# Patient Record
Sex: FEMALE | Race: ASIAN | NOT HISPANIC OR LATINO | Employment: OTHER | URBAN - METROPOLITAN AREA
[De-identification: names, ages, dates, MRNs, and addresses within clinical notes are randomized per-mention and may not be internally consistent; named-entity substitution may affect disease eponyms.]

---

## 2022-07-11 ENCOUNTER — HOSPITAL ENCOUNTER (EMERGENCY)
Facility: CLINIC | Age: 67
Discharge: HOME | End: 2022-07-11
Attending: EMERGENCY MEDICINE | Admitting: EMERGENCY MEDICINE
Payer: COMMERCIAL

## 2022-07-11 ENCOUNTER — APPOINTMENT (OUTPATIENT)
Dept: GENERAL RADIOLOGY | Facility: CLINIC | Age: 67
End: 2022-07-11
Attending: EMERGENCY MEDICINE
Payer: COMMERCIAL

## 2022-07-11 VITALS
TEMPERATURE: 97.6 F | WEIGHT: 145.94 LBS | DIASTOLIC BLOOD PRESSURE: 66 MMHG | OXYGEN SATURATION: 93 % | SYSTOLIC BLOOD PRESSURE: 114 MMHG | HEART RATE: 96 BPM | RESPIRATION RATE: 18 BRPM

## 2022-07-11 DIAGNOSIS — E11.65 TYPE 2 DIABETES MELLITUS WITH HYPERGLYCEMIA, WITHOUT LONG-TERM CURRENT USE OF INSULIN (H): ICD-10-CM

## 2022-07-11 DIAGNOSIS — T78.40XA ALLERGIC REACTION, INITIAL ENCOUNTER: ICD-10-CM

## 2022-07-11 DIAGNOSIS — R13.10 DYSPHAGIA, UNSPECIFIED TYPE: ICD-10-CM

## 2022-07-11 LAB
ALBUMIN SERPL-MCNC: 3.7 G/DL (ref 3.4–5)
ALP SERPL-CCNC: 50 U/L (ref 40–150)
ALT SERPL W P-5'-P-CCNC: 43 U/L (ref 0–50)
ANION GAP SERPL CALCULATED.3IONS-SCNC: 9 MMOL/L (ref 3–14)
AST SERPL W P-5'-P-CCNC: 57 U/L (ref 0–45)
BASOPHILS # BLD AUTO: 0 10E3/UL (ref 0–0.2)
BASOPHILS NFR BLD AUTO: 1 %
BILIRUB SERPL-MCNC: 0.5 MG/DL (ref 0.2–1.3)
BUN SERPL-MCNC: 13 MG/DL (ref 7–30)
CALCIUM SERPL-MCNC: 8.7 MG/DL (ref 8.5–10.1)
CHLORIDE BLD-SCNC: 106 MMOL/L (ref 94–109)
CO2 SERPL-SCNC: 23 MMOL/L (ref 20–32)
CREAT SERPL-MCNC: 0.39 MG/DL (ref 0.52–1.04)
DEPRECATED S PYO AG THROAT QL EIA: NEGATIVE
EOSINOPHIL # BLD AUTO: 0.1 10E3/UL (ref 0–0.7)
EOSINOPHIL NFR BLD AUTO: 2 %
ERYTHROCYTE [DISTWIDTH] IN BLOOD BY AUTOMATED COUNT: 12.8 % (ref 10–15)
GFR SERPL CREATININE-BSD FRML MDRD: >90 ML/MIN/1.73M2
GLUCOSE BLD-MCNC: 203 MG/DL (ref 70–99)
GROUP A STREP BY PCR: NOT DETECTED
HCT VFR BLD AUTO: 44.2 % (ref 35–47)
HGB BLD-MCNC: 14.1 G/DL (ref 11.7–15.7)
IMM GRANULOCYTES # BLD: 0 10E3/UL
IMM GRANULOCYTES NFR BLD: 1 %
LYMPHOCYTES # BLD AUTO: 1.2 10E3/UL (ref 0.8–5.3)
LYMPHOCYTES NFR BLD AUTO: 31 %
MCH RBC QN AUTO: 30.1 PG (ref 26.5–33)
MCHC RBC AUTO-ENTMCNC: 31.9 G/DL (ref 31.5–36.5)
MCV RBC AUTO: 94 FL (ref 78–100)
MONOCYTES # BLD AUTO: 0.3 10E3/UL (ref 0–1.3)
MONOCYTES NFR BLD AUTO: 8 %
NEUTROPHILS # BLD AUTO: 2.3 10E3/UL (ref 1.6–8.3)
NEUTROPHILS NFR BLD AUTO: 57 %
NRBC # BLD AUTO: 0 10E3/UL
NRBC BLD AUTO-RTO: 0 /100
PLATELET # BLD AUTO: 165 10E3/UL (ref 150–450)
POTASSIUM BLD-SCNC: 5 MMOL/L (ref 3.4–5.3)
PROT SERPL-MCNC: 7.3 G/DL (ref 6.8–8.8)
RBC # BLD AUTO: 4.69 10E6/UL (ref 3.8–5.2)
SARS-COV-2 RNA RESP QL NAA+PROBE: NEGATIVE
SODIUM SERPL-SCNC: 138 MMOL/L (ref 133–144)
TROPONIN I SERPL HS-MCNC: 4 NG/L
TROPONIN I SERPL HS-MCNC: <3 NG/L
WBC # BLD AUTO: 4 10E3/UL (ref 4–11)

## 2022-07-11 PROCEDURE — 99285 EMERGENCY DEPT VISIT HI MDM: CPT | Mod: 25

## 2022-07-11 PROCEDURE — 84484 ASSAY OF TROPONIN QUANT: CPT | Mod: 91 | Performed by: EMERGENCY MEDICINE

## 2022-07-11 PROCEDURE — 80053 COMPREHEN METABOLIC PANEL: CPT | Performed by: EMERGENCY MEDICINE

## 2022-07-11 PROCEDURE — 70360 X-RAY EXAM OF NECK: CPT

## 2022-07-11 PROCEDURE — 84484 ASSAY OF TROPONIN QUANT: CPT | Performed by: EMERGENCY MEDICINE

## 2022-07-11 PROCEDURE — 96372 THER/PROPH/DIAG INJ SC/IM: CPT | Performed by: EMERGENCY MEDICINE

## 2022-07-11 PROCEDURE — 85025 COMPLETE CBC W/AUTO DIFF WBC: CPT | Performed by: EMERGENCY MEDICINE

## 2022-07-11 PROCEDURE — 87651 STREP A DNA AMP PROBE: CPT | Performed by: EMERGENCY MEDICINE

## 2022-07-11 PROCEDURE — U0003 INFECTIOUS AGENT DETECTION BY NUCLEIC ACID (DNA OR RNA); SEVERE ACUTE RESPIRATORY SYNDROME CORONAVIRUS 2 (SARS-COV-2) (CORONAVIRUS DISEASE [COVID-19]), AMPLIFIED PROBE TECHNIQUE, MAKING USE OF HIGH THROUGHPUT TECHNOLOGIES AS DESCRIBED BY CMS-2020-01-R: HCPCS | Performed by: EMERGENCY MEDICINE

## 2022-07-11 PROCEDURE — 250N000013 HC RX MED GY IP 250 OP 250 PS 637: Performed by: EMERGENCY MEDICINE

## 2022-07-11 PROCEDURE — 93005 ELECTROCARDIOGRAM TRACING: CPT

## 2022-07-11 PROCEDURE — 258N000003 HC RX IP 258 OP 636: Performed by: EMERGENCY MEDICINE

## 2022-07-11 PROCEDURE — 96374 THER/PROPH/DIAG INJ IV PUSH: CPT

## 2022-07-11 PROCEDURE — 250N000011 HC RX IP 250 OP 636: Performed by: EMERGENCY MEDICINE

## 2022-07-11 PROCEDURE — 96361 HYDRATE IV INFUSION ADD-ON: CPT

## 2022-07-11 PROCEDURE — C9803 HOPD COVID-19 SPEC COLLECT: HCPCS

## 2022-07-11 PROCEDURE — 36415 COLL VENOUS BLD VENIPUNCTURE: CPT | Performed by: EMERGENCY MEDICINE

## 2022-07-11 RX ORDER — ONDANSETRON 2 MG/ML
4 INJECTION INTRAMUSCULAR; INTRAVENOUS ONCE
Status: COMPLETED | OUTPATIENT
Start: 2022-07-11 | End: 2022-07-11

## 2022-07-11 RX ORDER — FAMOTIDINE 20 MG/1
20 TABLET, FILM COATED ORAL ONCE
Status: COMPLETED | OUTPATIENT
Start: 2022-07-11 | End: 2022-07-11

## 2022-07-11 RX ORDER — DIPHENHYDRAMINE HCL 25 MG
25 CAPSULE ORAL ONCE
Status: COMPLETED | OUTPATIENT
Start: 2022-07-11 | End: 2022-07-11

## 2022-07-11 RX ORDER — DIPHENHYDRAMINE HCL 25 MG
25 CAPSULE ORAL EVERY 6 HOURS PRN
Qty: 30 CAPSULE | Refills: 0 | Status: SHIPPED | OUTPATIENT
Start: 2022-07-11 | End: 2024-02-11

## 2022-07-11 RX ORDER — FAMOTIDINE 20 MG/1
20 TABLET, FILM COATED ORAL 2 TIMES DAILY
Qty: 10 TABLET | Refills: 0 | Status: SHIPPED | OUTPATIENT
Start: 2022-07-11 | End: 2022-07-16

## 2022-07-11 RX ORDER — EPINEPHRINE 0.3 MG/.3ML
0.3 INJECTION SUBCUTANEOUS PRN
Qty: 2 EACH | Refills: 0 | Status: SHIPPED | OUTPATIENT
Start: 2022-07-11

## 2022-07-11 RX ADMIN — SODIUM CHLORIDE 500 ML: 9 INJECTION, SOLUTION INTRAVENOUS at 10:24

## 2022-07-11 RX ADMIN — FAMOTIDINE 20 MG: 20 TABLET ORAL at 10:20

## 2022-07-11 RX ADMIN — EPINEPHRINE 0.3 MG: 1 INJECTION INTRAMUSCULAR; INTRAVENOUS; SUBCUTANEOUS at 12:09

## 2022-07-11 RX ADMIN — DIPHENHYDRAMINE HYDROCHLORIDE 25 MG: 25 CAPSULE ORAL at 10:20

## 2022-07-11 RX ADMIN — ONDANSETRON 4 MG: 2 INJECTION INTRAMUSCULAR; INTRAVENOUS at 10:20

## 2022-07-11 ASSESSMENT — ENCOUNTER SYMPTOMS
COUGH: 0
NAUSEA: 1
CHOKING: 1
CHILLS: 0
WHEEZING: 0
VOMITING: 0
FEVER: 0
TROUBLE SWALLOWING: 0
ABDOMINAL PAIN: 0
SHORTNESS OF BREATH: 0

## 2022-07-11 NOTE — DISCHARGE INSTRUCTIONS
Discharge Instructions  Hyperglycemia, High Blood Sugar    Today we found your blood sugar (glucose) was high. This may mean that you have developed diabetes, or if you already know that you have diabetes, it may mean that your diabetes is not as well controlled as it should be. Sometimes blood sugar can be high temporarily and it is not diabetes. Signs of elevated blood sugar include increased thirst, frequent urination (peeing), blurred vision, fatigue, unexplained weight loss, poor wound healing, and frequent infections.    We sometimes give medicine in the Emergency Department to lower the blood sugar. We may also prescribe medicine for you to use at home, or increase the medicine that you already take. While we do not like to see your blood sugar high, it is much more dangerous to let your blood sugar get too low, so it is reasonable to take time to bring it down, or to wait and watch to see if it comes down on its own.    Generally, every Emergency Department visit should have a follow-up clinic visit with either a primary or a specialty clinic/provider. Please follow-up as instructed by your emergency provider today.     Return to the Emergency Department if you develop:  Vomiting (throwing up).  Confusion, disorientation, or being unable to wake up.  Severe weakness or illness.  Abdominal (belly) pain.    What can I do to help myself?  Check your blood sugar as instructed by your provider.  Take medications prescribed by your provider.  Follow a diabetic diet (low fat, low concentrated sweets, high fiber).  Exercise regularly.  Moderate or eliminate alcohol use.  Stop smoking.    Diabetes: Diabetes mellitus is a disease in which the body cannot regulate the amount of sugar (glucose) in the blood. Insulin allows glucose to move out of the blood into cells throughout the body where it is used for fuel. People with diabetes do not produce enough insulin (type 1 diabetes), or cannot use insulin properly (type 2  diabetes), or both. This starves the cells that need the glucose for fuel, and also harms certain organs and tissues exposed to the high glucose levels.  Over a long period of time, uncontrolled diabetes can lead to heart and blood vessel disease, blindness, kidney failure, foot ulcers and many other problems.          About 17 million Americans (6.2% of adults) have diabetes. We think that about one third of adults with diabetes do not know they have diabetes.  The incidence of diabetes is increasing rapidly. This increase is due to many factors, but the most significant are our increasing weight and decreased activity levels.     Diabetes can be a very serious and life-threatening illness if not treated.  If you were given a prescription for medicine here today, be sure to read all of the information (including the package insert) that comes with your prescription.  This will include important information about the medicine, its side effects, and any warnings that you need to know about.  The pharmacist who fills the prescription can provide more information and answer questions you may have about the medicine.  If you have questions or concerns that the pharmacist cannot address, please call or return to the Emergency Department.   Remember that you can always come back to the Emergency Department if you are not able to see your regular provider in the amount of time listed above, if you get any new symptoms, or if there is anything that worries you.

## 2022-07-11 NOTE — ED TRIAGE NOTES
"Pt has been taking Trulicity for the past 11 weeks. Pt began \"feeling like I'm being choked\" this morning. Pt also endorses hives and rash around injection site that started 2 weeks ago. Denies trouble swallowing or breathing. Pt took benadryl at 0900 this morning and allegra last night. VSS      "

## 2022-07-11 NOTE — ED PROVIDER NOTES
History   Chief Complaint:  Allergic Reaction    The history is provided by the patient and medical records.      Charlette Rodgers is a 66 year old female with history of type II diabetes, hypertension, and hypercholesterolemia who presents with allergic reaction. Patient was prescribed Trulicity about 11 weeks ago from her primary care provider at [facility] in [place]. On her 10 th dose, the injection site developed hives and became itchy. On the 11 th dose, two days ago, the site developed hives and became itchy more than on the 10 th dose. Patient states she woke up this morning around 0600 and felt that her throat was tight and that she was choking. Reports that she can still swallow but it feels different. Patient notes that she drank water this morning because it has helped to alleviate acid reflux symptoms in the past but that she did not eat anything. States that she is feeling nauseous. Adds that she too 25 mg of benadryl this morning. Denies similar symptoms in the past. Denies dysphagia, shortness of breath, wheezing, abdominal cramping, vomiting, fever, chills, cough, and congestion.    Review of Systems   Constitutional: Negative for chills and fever.   HENT: Negative for congestion and trouble swallowing.    Respiratory: Positive for choking. Negative for cough, shortness of breath and wheezing.    Gastrointestinal: Positive for nausea. Negative for abdominal pain and vomiting.   Skin: Positive for rash (hives).   All other systems reviewed and are negative.    Allergies:  Penicillins    Medications:  Aspirin 81 MG  Simvastatin  Lisinopril  Omeprazole  Gabapentin  Metformin  Farxiga  Trulicity    Past Medical History:     Type II diabetes  Hypertension  Hypercholesterolemia  Diabetic polyneuropathy    Past Surgical History:     section    Family History:    Mother- cataract    Social History:  Presents with daughterOpal   Presents via private vehicle    Physical Exam     Patient  Vitals for the past 24 hrs:   BP Temp Temp src Pulse Resp SpO2 Weight   07/11/22 1330 114/66 -- -- 96 -- 93 % --   07/11/22 1300 126/67 -- -- 101 -- 95 % --   07/11/22 1200 133/79 -- -- 97 -- 95 % --   07/11/22 1130 121/76 -- -- 88 -- 95 % --   07/11/22 1100 (!) 142/97 -- -- 95 -- 95 % --   07/11/22 1030 130/86 -- -- 89 -- 94 % --   07/11/22 0952 (!) 182/99 97.6  F (36.4  C) Temporal 98 18 96 % 66.2 kg (145 lb 15.1 oz)     Physical Exam  General: Elderly female sitting upright  Eyes: PERRL, Conjunctive within normal limits.  No scleral icterus.  ENT: Moist mucous membranes, oropharynx clear.  Uvula fully visible and normal in appearance.  Mild posterior pharyngeal erythema, but no edema or exudate or lesions.  No pooling of secretions.  Tongue is normal in size and appearance.  No facial edema.  Speech is normal.  Neck: Trachea midline.  No palpable mass or crepitus.  CV: Normal S1S2, no murmur, rub or gallop. Regular rate and rhythm  Resp: Clear to auscultation bilaterally, no wheezes, rales or rhonchi. Normal respiratory effort.  GI: Abdomen is soft, nontender and nondistended. No palpable masses. No rebound or guarding.  MSK: No edema. Nontender. Normal active range of motion.  Skin: Warm and dry.  Circular area of erythema approximately 4 cm in diameter over the right lateral mid abdomen.  Nontender.  No palpable fluctuance or mass.  Dark red/purple area of skin color change over the dorsum of the right foot, nontender, no palpable mass or fluctuance.  No increased warmth to touch.  Neuro: Alert and oriented. Responds appropriately to all questions and commands. No focal findings appreciated. Normal muscle tone.  Psych: Normal mood and affect. Pleasant.    Emergency Department Course   ECG  ECG taken at 1017, ECG read at 1018  Normal sinus rhythm   Rate 92 bpm. GA interval 126 ms. QRS duration 86 ms. QT/QTc 376/464 ms. P-R-T axes 35 -3 6.     Imaging:  Neck soft tissue XR   Final Result        Report per  radiology    Laboratory:  Labs Ordered and Resulted from Time of ED Arrival to Time of ED Departure   COMPREHENSIVE METABOLIC PANEL - Abnormal       Result Value    Sodium 138      Potassium 5.0      Chloride 106      Carbon Dioxide (CO2) 23      Anion Gap 9      Urea Nitrogen 13      Creatinine 0.39 (*)     Calcium 8.7      Glucose 203 (*)     Alkaline Phosphatase 50      AST 57 (*)     ALT 43      Protein Total 7.3      Albumin 3.7      Bilirubin Total 0.5      GFR Estimate >90     TROPONIN I - Normal    Troponin I High Sensitivity <3     COVID-19 VIRUS (CORONAVIRUS) BY PCR - Normal    SARS CoV2 PCR Negative     TROPONIN I - Normal    Troponin I High Sensitivity 4     STREPTOCOCCUS A RAPID SCREEN W REFELX TO PCR - Normal    Group A Strep antigen Negative     CBC WITH PLATELETS AND DIFFERENTIAL    WBC Count 4.0      RBC Count 4.69      Hemoglobin 14.1      Hematocrit 44.2      MCV 94      MCH 30.1      MCHC 31.9      RDW 12.8      Platelet Count 165      % Neutrophils 57      % Lymphocytes 31      % Monocytes 8      % Eosinophils 2      % Basophils 1      % Immature Granulocytes 1      NRBCs per 100 WBC 0      Absolute Neutrophils 2.3      Absolute Lymphocytes 1.2      Absolute Monocytes 0.3      Absolute Eosinophils 0.1      Absolute Basophils 0.0      Absolute Immature Granulocytes 0.0      Absolute NRBCs 0.0     GROUP A STREPTOCOCCUS PCR THROAT SWAB     Emergency Department Course:     Reviewed:  I reviewed nursing notes, vitals, past medical history and Care Everywhere    Assessments:  1002 I obtained history and examined the patient as noted above.   1152 I rechecked and updated the patient. Patient reports that she is still experiencing a choking sensation and her throat hurts when she swallows.   1336 I rechecked and updated the patient. Patient reports that is feeling improved although still has a mild sensation of tightness.     Interventions:  1020 Diphenhydramine, 25 mg, PO  1020 Famotidine, 20 mg,  PO  1020 Ondansetron, 4 mg, IV  1024 0.9% NS Bolus, 500 mL, IV  1209 Epinephrine kit, 0.3 mg, IM    Disposition:  The patient was discharged to home.     Impression & Plan   Medical Decision Making:  Charlette Rodgers is a 66-year-old female who presents emergency department with concerns for an allergic reaction to Trulicity.  Over the last 2 injections she has developed localized hives and itching to her injection sites.  This morning she awoke with a sensation of throat tightness with no worsening of skin lesions and does not appear to have any difficulty swallowing or breathing here nor reported.  Soft tissue neck x-ray did not show any soft tissue swelling, evidence of epiglottitis or other acute pathology.  She is in no distress in the emergency department without hypoxia or tachypnea.  She does not have fever or other infectious signs or symptoms with negative COVID and strep testing.  She has no focal neurologic abnormalities.  Speech is normal.  She did have mild improvement with epinephrine but had no worsening after another observation period.  He is obviously having localized injection site reactions to Trulicity.  The etiology of dysphagia is not as clear.  Anaphylaxis seems a little less likely although it is considered.  She was discharged home with recommendation to continue with antihistamine therapy.  Discussed continue Trulicity use.  Use EpiPen as needed and if so call 911 and return to the emergency department any worsening.  Recommend follow-up with her PCP within the next 2 to 3 days for reassessment and discussion of alternative therapy aside from Trulicity.  She feels comfortable that plan.  All questions were answered prior to discharge.      Diagnosis:    ICD-10-CM    1. Dysphagia, unspecified type  R13.10    2. Allergic reaction, initial encounter  T78.40XA    3. Type 2 diabetes mellitus with hyperglycemia, without long-term current use of insulin (H)  E11.65        Discharge Medications:  New  Prescriptions    DIPHENHYDRAMINE (BENADRYL) 25 MG CAPSULE    Take 1 capsule (25 mg) by mouth every 6 hours as needed for itching    EPINEPHRINE (ANY BX GENERIC EQUIV) 0.3 MG/0.3ML INJECTION 2-PACK    Inject 0.3 mLs (0.3 mg) into the muscle as needed for anaphylaxis May repeat one time in 5-15 minutes if response to initial dose is inadequate.    FAMOTIDINE (PEPCID) 20 MG TABLET    Take 1 tablet (20 mg) by mouth 2 times daily for 5 days     Scribe Disclosure:  Dee BOATENG, am serving as a scribe at 9:59 AM on 7/11/2022 to document services personally performed by Vanessa Cunningham MD based on my observations and the provider's statements to me.      Vanessa Cunningham MD  07/11/22 0649

## 2022-07-12 LAB
ATRIAL RATE - MUSE: 92 BPM
DIASTOLIC BLOOD PRESSURE - MUSE: NORMAL MMHG
INTERPRETATION ECG - MUSE: NORMAL
P AXIS - MUSE: 35 DEGREES
PR INTERVAL - MUSE: 126 MS
QRS DURATION - MUSE: 86 MS
QT - MUSE: 376 MS
QTC - MUSE: 464 MS
R AXIS - MUSE: -3 DEGREES
SYSTOLIC BLOOD PRESSURE - MUSE: NORMAL MMHG
T AXIS - MUSE: 6 DEGREES
VENTRICULAR RATE- MUSE: 92 BPM

## 2024-02-11 ENCOUNTER — HOSPITAL ENCOUNTER (INPATIENT)
Facility: CLINIC | Age: 69
LOS: 3 days | Discharge: HOME | DRG: 391 | End: 2024-02-14
Attending: SOCIAL WORKER | Admitting: STUDENT IN AN ORGANIZED HEALTH CARE EDUCATION/TRAINING PROGRAM
Payer: MEDICARE

## 2024-02-11 ENCOUNTER — APPOINTMENT (OUTPATIENT)
Dept: CT IMAGING | Facility: CLINIC | Age: 69
DRG: 391 | End: 2024-02-11
Attending: SOCIAL WORKER
Payer: MEDICARE

## 2024-02-11 DIAGNOSIS — R11.10 VOMITING AND DIARRHEA: ICD-10-CM

## 2024-02-11 DIAGNOSIS — E87.20 LACTIC ACIDOSIS: ICD-10-CM

## 2024-02-11 DIAGNOSIS — E86.1 HYPOVOLEMIA: ICD-10-CM

## 2024-02-11 DIAGNOSIS — R19.7 VOMITING AND DIARRHEA: ICD-10-CM

## 2024-02-11 DIAGNOSIS — Z86.39 HISTORY OF DIABETES MELLITUS: ICD-10-CM

## 2024-02-11 DIAGNOSIS — R65.20 SEVERE SEPSIS (H): ICD-10-CM

## 2024-02-11 DIAGNOSIS — A09 INFECTIOUS ENTERITIS, UNSPECIFIED INFECTIOUS AGENT: ICD-10-CM

## 2024-02-11 DIAGNOSIS — A41.9 SEVERE SEPSIS (H): ICD-10-CM

## 2024-02-11 LAB
ALBUMIN SERPL BCG-MCNC: 4.3 G/DL (ref 3.5–5.2)
ALBUMIN UR-MCNC: NEGATIVE MG/DL
ALP SERPL-CCNC: 43 U/L (ref 40–150)
ALT SERPL W P-5'-P-CCNC: 38 U/L (ref 0–50)
ANION GAP SERPL CALCULATED.3IONS-SCNC: 22 MMOL/L (ref 7–15)
APPEARANCE UR: CLEAR
AST SERPL W P-5'-P-CCNC: 31 U/L (ref 0–45)
B-OH-BUTYR SERPL-SCNC: 0.6 MMOL/L
BASOPHILS # BLD AUTO: 0 10E3/UL (ref 0–0.2)
BASOPHILS NFR BLD AUTO: 0 %
BILIRUB SERPL-MCNC: 1.4 MG/DL
BILIRUB UR QL STRIP: NEGATIVE
BUN SERPL-MCNC: 29.9 MG/DL (ref 8–23)
CALCIUM SERPL-MCNC: 9.7 MG/DL (ref 8.8–10.2)
CHLORIDE SERPL-SCNC: 96 MMOL/L (ref 98–107)
COLOR UR AUTO: ABNORMAL
CREAT SERPL-MCNC: 0.9 MG/DL (ref 0.51–0.95)
DEPRECATED HCO3 PLAS-SCNC: 15 MMOL/L (ref 22–29)
EGFRCR SERPLBLD CKD-EPI 2021: 69 ML/MIN/1.73M2
EOSINOPHIL # BLD AUTO: 0 10E3/UL (ref 0–0.7)
EOSINOPHIL NFR BLD AUTO: 0 %
ERYTHROCYTE [DISTWIDTH] IN BLOOD BY AUTOMATED COUNT: 13.3 % (ref 10–15)
FLUAV RNA SPEC QL NAA+PROBE: NEGATIVE
FLUBV RNA RESP QL NAA+PROBE: NEGATIVE
GLUCOSE BLDC GLUCOMTR-MCNC: 148 MG/DL (ref 70–99)
GLUCOSE SERPL-MCNC: 293 MG/DL (ref 70–99)
GLUCOSE UR STRIP-MCNC: >=1000 MG/DL
HCO3 BLDV-SCNC: 16 MMOL/L (ref 21–28)
HCO3 BLDV-SCNC: 17 MMOL/L (ref 21–28)
HCO3 BLDV-SCNC: 18 MMOL/L (ref 21–28)
HCT VFR BLD AUTO: 52.3 % (ref 35–47)
HGB BLD-MCNC: 16.9 G/DL (ref 11.7–15.7)
HGB UR QL STRIP: NEGATIVE
HOLD SPECIMEN: NORMAL
IMM GRANULOCYTES # BLD: 0 10E3/UL
IMM GRANULOCYTES NFR BLD: 0 %
KETONES UR STRIP-MCNC: NEGATIVE MG/DL
LACTATE BLD-SCNC: 3.4 MMOL/L
LACTATE BLD-SCNC: 4 MMOL/L
LACTATE BLD-SCNC: 5.6 MMOL/L
LEUKOCYTE ESTERASE UR QL STRIP: NEGATIVE
LIPASE SERPL-CCNC: 14 U/L (ref 13–60)
LYMPHOCYTES # BLD AUTO: 0.7 10E3/UL (ref 0.8–5.3)
LYMPHOCYTES NFR BLD AUTO: 12 %
MCH RBC QN AUTO: 30.6 PG (ref 26.5–33)
MCHC RBC AUTO-ENTMCNC: 32.3 G/DL (ref 31.5–36.5)
MCV RBC AUTO: 95 FL (ref 78–100)
MONOCYTES # BLD AUTO: 0.7 10E3/UL (ref 0–1.3)
MONOCYTES NFR BLD AUTO: 12 %
MUCOUS THREADS #/AREA URNS LPF: PRESENT /LPF
NEUTROPHILS # BLD AUTO: 4.4 10E3/UL (ref 1.6–8.3)
NEUTROPHILS NFR BLD AUTO: 76 %
NITRATE UR QL: NEGATIVE
NRBC # BLD AUTO: 0 10E3/UL
NRBC BLD AUTO-RTO: 0 /100
PCO2 BLDV: 30 MM HG (ref 40–50)
PCO2 BLDV: 32 MM HG (ref 40–50)
PCO2 BLDV: 35 MM HG (ref 40–50)
PH BLDV: 7.3 [PH] (ref 7.32–7.43)
PH BLDV: 7.31 [PH] (ref 7.32–7.43)
PH BLDV: 7.4 [PH] (ref 7.32–7.43)
PH UR STRIP: 5.5 [PH] (ref 5–7)
PLATELET # BLD AUTO: 192 10E3/UL (ref 150–450)
PO2 BLDV: 24 MM HG (ref 25–47)
PO2 BLDV: 25 MM HG (ref 25–47)
PO2 BLDV: 26 MM HG (ref 25–47)
POTASSIUM SERPL-SCNC: 4.2 MMOL/L (ref 3.4–5.3)
PROT SERPL-MCNC: 6.9 G/DL (ref 6.4–8.3)
RBC # BLD AUTO: 5.52 10E6/UL (ref 3.8–5.2)
RBC URINE: <1 /HPF
RSV RNA SPEC NAA+PROBE: NEGATIVE
SAO2 % BLDV: 38 % (ref 70–75)
SAO2 % BLDV: 40 % (ref 70–75)
SAO2 % BLDV: 51 % (ref 70–75)
SARS-COV-2 RNA RESP QL NAA+PROBE: NEGATIVE
SODIUM SERPL-SCNC: 133 MMOL/L (ref 135–145)
SP GR UR STRIP: 1.03 (ref 1–1.03)
SQUAMOUS EPITHELIAL: <1 /HPF
TROPONIN T SERPL HS-MCNC: 8 NG/L
UROBILINOGEN UR STRIP-MCNC: NORMAL MG/DL
WBC # BLD AUTO: 5.8 10E3/UL (ref 4–11)
WBC URINE: <1 /HPF

## 2024-02-11 PROCEDURE — 99223 1ST HOSP IP/OBS HIGH 75: CPT | Mod: AI | Performed by: STUDENT IN AN ORGANIZED HEALTH CARE EDUCATION/TRAINING PROGRAM

## 2024-02-11 PROCEDURE — 82803 BLOOD GASES ANY COMBINATION: CPT

## 2024-02-11 PROCEDURE — 120N000001 HC R&B MED SURG/OB

## 2024-02-11 PROCEDURE — 83690 ASSAY OF LIPASE: CPT | Performed by: SOCIAL WORKER

## 2024-02-11 PROCEDURE — 96376 TX/PRO/DX INJ SAME DRUG ADON: CPT

## 2024-02-11 PROCEDURE — 82962 GLUCOSE BLOOD TEST: CPT

## 2024-02-11 PROCEDURE — 96361 HYDRATE IV INFUSION ADD-ON: CPT

## 2024-02-11 PROCEDURE — 93005 ELECTROCARDIOGRAM TRACING: CPT

## 2024-02-11 PROCEDURE — 250N000009 HC RX 250: Performed by: STUDENT IN AN ORGANIZED HEALTH CARE EDUCATION/TRAINING PROGRAM

## 2024-02-11 PROCEDURE — 84484 ASSAY OF TROPONIN QUANT: CPT | Performed by: SOCIAL WORKER

## 2024-02-11 PROCEDURE — 96365 THER/PROPH/DIAG IV INF INIT: CPT

## 2024-02-11 PROCEDURE — 250N000011 HC RX IP 250 OP 636: Performed by: STUDENT IN AN ORGANIZED HEALTH CARE EDUCATION/TRAINING PROGRAM

## 2024-02-11 PROCEDURE — 74177 CT ABD & PELVIS W/CONTRAST: CPT | Mod: MG

## 2024-02-11 PROCEDURE — 250N000009 HC RX 250: Performed by: SOCIAL WORKER

## 2024-02-11 PROCEDURE — 36415 COLL VENOUS BLD VENIPUNCTURE: CPT | Performed by: SOCIAL WORKER

## 2024-02-11 PROCEDURE — 258N000003 HC RX IP 258 OP 636: Performed by: STUDENT IN AN ORGANIZED HEALTH CARE EDUCATION/TRAINING PROGRAM

## 2024-02-11 PROCEDURE — 250N000011 HC RX IP 250 OP 636: Performed by: SOCIAL WORKER

## 2024-02-11 PROCEDURE — 258N000003 HC RX IP 258 OP 636: Performed by: SOCIAL WORKER

## 2024-02-11 PROCEDURE — 87040 BLOOD CULTURE FOR BACTERIA: CPT | Performed by: SOCIAL WORKER

## 2024-02-11 PROCEDURE — 87637 SARSCOV2&INF A&B&RSV AMP PRB: CPT | Performed by: SOCIAL WORKER

## 2024-02-11 PROCEDURE — 96375 TX/PRO/DX INJ NEW DRUG ADDON: CPT

## 2024-02-11 PROCEDURE — 99291 CRITICAL CARE FIRST HOUR: CPT | Mod: 25

## 2024-02-11 PROCEDURE — 250N000013 HC RX MED GY IP 250 OP 250 PS 637: Performed by: SOCIAL WORKER

## 2024-02-11 PROCEDURE — 96368 THER/DIAG CONCURRENT INF: CPT

## 2024-02-11 PROCEDURE — 82010 KETONE BODYS QUAN: CPT | Performed by: SOCIAL WORKER

## 2024-02-11 PROCEDURE — 80053 COMPREHEN METABOLIC PANEL: CPT | Performed by: SOCIAL WORKER

## 2024-02-11 PROCEDURE — 96366 THER/PROPH/DIAG IV INF ADDON: CPT

## 2024-02-11 PROCEDURE — 81001 URINALYSIS AUTO W/SCOPE: CPT | Performed by: SOCIAL WORKER

## 2024-02-11 PROCEDURE — 258N000002 HC RX IP 258 OP 250: Performed by: STUDENT IN AN ORGANIZED HEALTH CARE EDUCATION/TRAINING PROGRAM

## 2024-02-11 PROCEDURE — 85025 COMPLETE CBC W/AUTO DIFF WBC: CPT | Performed by: SOCIAL WORKER

## 2024-02-11 RX ORDER — DICYCLOMINE HCL 20 MG
20 TABLET ORAL ONCE
Status: COMPLETED | OUTPATIENT
Start: 2024-02-11 | End: 2024-02-11

## 2024-02-11 RX ORDER — IOPAMIDOL 755 MG/ML
500 INJECTION, SOLUTION INTRAVASCULAR ONCE
Status: COMPLETED | OUTPATIENT
Start: 2024-02-11 | End: 2024-02-11

## 2024-02-11 RX ORDER — ONDANSETRON 2 MG/ML
4 INJECTION INTRAMUSCULAR; INTRAVENOUS ONCE
Status: COMPLETED | OUTPATIENT
Start: 2024-02-11 | End: 2024-02-11

## 2024-02-11 RX ORDER — LISINOPRIL 20 MG/1
20 TABLET ORAL AT BEDTIME
Status: ON HOLD | COMMUNITY
Start: 2023-09-01 | End: 2024-07-03

## 2024-02-11 RX ORDER — DAPAGLIFLOZIN 10 MG/1
10 TABLET, FILM COATED ORAL DAILY
COMMUNITY
Start: 2023-09-08

## 2024-02-11 RX ORDER — SIMVASTATIN 40 MG
40 TABLET ORAL AT BEDTIME
COMMUNITY

## 2024-02-11 RX ORDER — ROPIVACAINE IN 0.9% SOD CHL/PF 0.1 %
.03-.125 PLASTIC BAG, INJECTION (ML) EPIDURAL CONTINUOUS
Status: DISCONTINUED | OUTPATIENT
Start: 2024-02-11 | End: 2024-02-12

## 2024-02-11 RX ORDER — KETOROLAC TROMETHAMINE 15 MG/ML
15 INJECTION, SOLUTION INTRAMUSCULAR; INTRAVENOUS ONCE
Status: DISCONTINUED | OUTPATIENT
Start: 2024-02-11 | End: 2024-02-11

## 2024-02-11 RX ORDER — ONDANSETRON 2 MG/ML
4 INJECTION INTRAMUSCULAR; INTRAVENOUS
Status: COMPLETED | OUTPATIENT
Start: 2024-02-11 | End: 2024-02-11

## 2024-02-11 RX ORDER — ROPIVACAINE IN 0.9% SOD CHL/PF 0.1 %
.03-.125 PLASTIC BAG, INJECTION (ML) EPIDURAL CONTINUOUS
Status: DISCONTINUED | OUTPATIENT
Start: 2024-02-11 | End: 2024-02-11

## 2024-02-11 RX ORDER — GABAPENTIN 600 MG/1
600 TABLET ORAL 3 TIMES DAILY
COMMUNITY

## 2024-02-11 RX ORDER — ALENDRONATE SODIUM 70 MG/1
70 TABLET ORAL
COMMUNITY

## 2024-02-11 RX ORDER — PIPERACILLIN SODIUM, TAZOBACTAM SODIUM 3; .375 G/15ML; G/15ML
3.38 INJECTION, POWDER, LYOPHILIZED, FOR SOLUTION INTRAVENOUS ONCE
Status: COMPLETED | OUTPATIENT
Start: 2024-02-11 | End: 2024-02-11

## 2024-02-11 RX ADMIN — POTASSIUM CHLORIDE: 2 INJECTION, SOLUTION, CONCENTRATE INTRAVENOUS at 23:06

## 2024-02-11 RX ADMIN — SODIUM CHLORIDE 1000 ML: 9 INJECTION, SOLUTION INTRAVENOUS at 20:50

## 2024-02-11 RX ADMIN — SODIUM CHLORIDE, POTASSIUM CHLORIDE, SODIUM LACTATE AND CALCIUM CHLORIDE 1000 ML: 600; 310; 30; 20 INJECTION, SOLUTION INTRAVENOUS at 23:08

## 2024-02-11 RX ADMIN — DICYCLOMINE HYDROCHLORIDE 20 MG: 20 TABLET ORAL at 20:51

## 2024-02-11 RX ADMIN — ONDANSETRON 4 MG: 2 INJECTION INTRAMUSCULAR; INTRAVENOUS at 20:50

## 2024-02-11 RX ADMIN — VANCOMYCIN HYDROCHLORIDE 1500 MG: 5 INJECTION, POWDER, LYOPHILIZED, FOR SOLUTION INTRAVENOUS at 20:54

## 2024-02-11 RX ADMIN — SODIUM CHLORIDE 1000 ML: 9 INJECTION, SOLUTION INTRAVENOUS at 18:27

## 2024-02-11 RX ADMIN — SODIUM CHLORIDE 58 ML: 9 INJECTION, SOLUTION INTRAVENOUS at 21:19

## 2024-02-11 RX ADMIN — PIPERACILLIN AND TAZOBACTAM 3.38 G: 3; .375 INJECTION, POWDER, FOR SOLUTION INTRAVENOUS at 19:14

## 2024-02-11 RX ADMIN — ONDANSETRON 4 MG: 2 INJECTION INTRAMUSCULAR; INTRAVENOUS at 18:29

## 2024-02-11 RX ADMIN — IOPAMIDOL 71 ML: 755 INJECTION, SOLUTION INTRAVENOUS at 21:19

## 2024-02-11 RX ADMIN — FAMOTIDINE 20 MG: 10 INJECTION, SOLUTION INTRAVENOUS at 20:49

## 2024-02-11 ASSESSMENT — ACTIVITIES OF DAILY LIVING (ADL)
ADLS_ACUITY_SCORE: 35

## 2024-02-11 NOTE — ED TRIAGE NOTES
Pt arrives with daughter who reports that pt has been having N/V/D since yesterday, pt reports subjective fevers at home highest of 103.8 and tylenol given at 1630 today. No blood in stool or emesis. PT VSS and ABC's intact, generalized abdominal pain

## 2024-02-12 LAB
ADV 40+41 DNA STL QL NAA+NON-PROBE: NEGATIVE
ALBUMIN SERPL BCG-MCNC: 3 G/DL (ref 3.5–5.2)
ALP SERPL-CCNC: 27 U/L (ref 40–150)
ALT SERPL W P-5'-P-CCNC: 39 U/L (ref 0–50)
ANION GAP SERPL CALCULATED.3IONS-SCNC: 10 MMOL/L (ref 7–15)
ANION GAP SERPL CALCULATED.3IONS-SCNC: 13 MMOL/L (ref 7–15)
AST SERPL W P-5'-P-CCNC: 40 U/L (ref 0–45)
ASTRO TYP 1-8 RNA STL QL NAA+NON-PROBE: NEGATIVE
ATRIAL RATE - MUSE: 127 BPM
BASOPHILS # BLD AUTO: ABNORMAL 10*3/UL
BASOPHILS # BLD MANUAL: 0.1 10E3/UL (ref 0–0.2)
BASOPHILS NFR BLD AUTO: ABNORMAL %
BASOPHILS NFR BLD MANUAL: 1 %
BILIRUB SERPL-MCNC: 0.8 MG/DL
BUN SERPL-MCNC: 17.5 MG/DL (ref 8–23)
BUN SERPL-MCNC: 24 MG/DL (ref 8–23)
C CAYETANENSIS DNA STL QL NAA+NON-PROBE: NEGATIVE
C DIFF TOX B STL QL: NEGATIVE
CALCIUM SERPL-MCNC: 7.6 MG/DL (ref 8.8–10.2)
CALCIUM SERPL-MCNC: 7.8 MG/DL (ref 8.8–10.2)
CAMPYLOBACTER DNA SPEC NAA+PROBE: NEGATIVE
CHLORIDE SERPL-SCNC: 106 MMOL/L (ref 98–107)
CHLORIDE SERPL-SCNC: 108 MMOL/L (ref 98–107)
CORTIS SERPL-MCNC: 9.9 UG/DL
CREAT SERPL-MCNC: 0.53 MG/DL (ref 0.51–0.95)
CREAT SERPL-MCNC: 0.65 MG/DL (ref 0.51–0.95)
CRYPTOSP DNA STL QL NAA+NON-PROBE: NEGATIVE
DEPRECATED HCO3 PLAS-SCNC: 15 MMOL/L (ref 22–29)
DEPRECATED HCO3 PLAS-SCNC: 19 MMOL/L (ref 22–29)
DIASTOLIC BLOOD PRESSURE - MUSE: NORMAL MMHG
E COLI O157 DNA STL QL NAA+NON-PROBE: NORMAL
E HISTOLYT DNA STL QL NAA+NON-PROBE: NEGATIVE
EAEC ASTA GENE ISLT QL NAA+PROBE: NEGATIVE
EC STX1+STX2 GENES STL QL NAA+NON-PROBE: NEGATIVE
EGFRCR SERPLBLD CKD-EPI 2021: >90 ML/MIN/1.73M2
EGFRCR SERPLBLD CKD-EPI 2021: >90 ML/MIN/1.73M2
EOSINOPHIL # BLD AUTO: ABNORMAL 10*3/UL
EOSINOPHIL # BLD MANUAL: 0.1 10E3/UL (ref 0–0.7)
EOSINOPHIL NFR BLD AUTO: ABNORMAL %
EOSINOPHIL NFR BLD MANUAL: 2 %
EPEC EAE GENE STL QL NAA+NON-PROBE: NEGATIVE
ERYTHROCYTE [DISTWIDTH] IN BLOOD BY AUTOMATED COUNT: 13.5 % (ref 10–15)
ERYTHROCYTE [DISTWIDTH] IN BLOOD BY AUTOMATED COUNT: 13.5 % (ref 10–15)
ETEC LTA+ST1A+ST1B TOX ST NAA+NON-PROBE: NEGATIVE
G LAMBLIA DNA STL QL NAA+NON-PROBE: NEGATIVE
GLUCOSE BLDC GLUCOMTR-MCNC: 118 MG/DL (ref 70–99)
GLUCOSE BLDC GLUCOMTR-MCNC: 122 MG/DL (ref 70–99)
GLUCOSE BLDC GLUCOMTR-MCNC: 130 MG/DL (ref 70–99)
GLUCOSE BLDC GLUCOMTR-MCNC: 147 MG/DL (ref 70–99)
GLUCOSE BLDC GLUCOMTR-MCNC: 202 MG/DL (ref 70–99)
GLUCOSE BLDC GLUCOMTR-MCNC: 91 MG/DL (ref 70–99)
GLUCOSE BLDC GLUCOMTR-MCNC: 94 MG/DL (ref 70–99)
GLUCOSE SERPL-MCNC: 138 MG/DL (ref 70–99)
GLUCOSE SERPL-MCNC: 208 MG/DL (ref 70–99)
HBA1C MFR BLD: 7.1 %
HCO3 BLDV-SCNC: 18 MMOL/L (ref 21–28)
HCT VFR BLD AUTO: 39.6 % (ref 35–47)
HCT VFR BLD AUTO: 39.6 % (ref 35–47)
HGB BLD-MCNC: 13 G/DL (ref 11.7–15.7)
HGB BLD-MCNC: 13 G/DL (ref 11.7–15.7)
HGB BLD-MCNC: 13.5 G/DL (ref 11.7–15.7)
IMM GRANULOCYTES # BLD: ABNORMAL 10*3/UL
IMM GRANULOCYTES NFR BLD: ABNORMAL %
INTERPRETATION ECG - MUSE: NORMAL
LACTATE BLD-SCNC: 2.2 MMOL/L
LACTATE SERPL-SCNC: 1 MMOL/L (ref 0.7–2)
LACTATE SERPL-SCNC: 1.4 MMOL/L (ref 0.7–2)
LACTATE SERPL-SCNC: 1.9 MMOL/L (ref 0.7–2)
LYMPHOCYTES # BLD AUTO: ABNORMAL 10*3/UL
LYMPHOCYTES # BLD MANUAL: 0.8 10E3/UL (ref 0.8–5.3)
LYMPHOCYTES NFR BLD AUTO: ABNORMAL %
LYMPHOCYTES NFR BLD MANUAL: 13 %
MAGNESIUM SERPL-MCNC: 1.5 MG/DL (ref 1.7–2.3)
MAGNESIUM SERPL-MCNC: 2.5 MG/DL (ref 1.7–2.3)
MCH RBC QN AUTO: 30.7 PG (ref 26.5–33)
MCH RBC QN AUTO: 30.7 PG (ref 26.5–33)
MCHC RBC AUTO-ENTMCNC: 32.8 G/DL (ref 31.5–36.5)
MCHC RBC AUTO-ENTMCNC: 32.8 G/DL (ref 31.5–36.5)
MCV RBC AUTO: 94 FL (ref 78–100)
MCV RBC AUTO: 94 FL (ref 78–100)
MONOCYTES # BLD AUTO: ABNORMAL 10*3/UL
MONOCYTES # BLD MANUAL: 0.3 10E3/UL (ref 0–1.3)
MONOCYTES NFR BLD AUTO: ABNORMAL %
MONOCYTES NFR BLD MANUAL: 5 %
NEUTROPHILS # BLD AUTO: ABNORMAL 10*3/UL
NEUTROPHILS # BLD MANUAL: 4.8 10E3/UL (ref 1.6–8.3)
NEUTROPHILS NFR BLD AUTO: ABNORMAL %
NEUTROPHILS NFR BLD MANUAL: 79 %
NOROVIRUS GI+II RNA STL QL NAA+NON-PROBE: NEGATIVE
P AXIS - MUSE: 54 DEGREES
P SHIGELLOIDES DNA STL QL NAA+NON-PROBE: NEGATIVE
PCO2 BLDV: 25 MM HG (ref 40–50)
PH BLDV: 7.46 [PH] (ref 7.32–7.43)
PHOSPHATE SERPL-MCNC: 2.5 MG/DL (ref 2.5–4.5)
PLAT MORPH BLD: NORMAL
PLATELET # BLD AUTO: 130 10E3/UL (ref 150–450)
PLATELET # BLD AUTO: 130 10E3/UL (ref 150–450)
PO2 BLDV: 43 MM HG (ref 25–47)
POTASSIUM SERPL-SCNC: 3.5 MMOL/L (ref 3.4–5.3)
POTASSIUM SERPL-SCNC: 3.8 MMOL/L (ref 3.4–5.3)
PR INTERVAL - MUSE: 130 MS
PROT SERPL-MCNC: 5 G/DL (ref 6.4–8.3)
QRS DURATION - MUSE: 78 MS
QT - MUSE: 320 MS
QTC - MUSE: 465 MS
R AXIS - MUSE: 2 DEGREES
RBC # BLD AUTO: 4.23 10E6/UL (ref 3.8–5.2)
RBC # BLD AUTO: 4.23 10E6/UL (ref 3.8–5.2)
RBC MORPH BLD: NORMAL
RVA RNA STL QL NAA+NON-PROBE: NEGATIVE
SALMONELLA SP RPOD STL QL NAA+PROBE: NEGATIVE
SAO2 % BLDV: 83 % (ref 70–75)
SAPO I+II+IV+V RNA STL QL NAA+NON-PROBE: NEGATIVE
SHIGELLA SP+EIEC IPAH ST NAA+NON-PROBE: NEGATIVE
SODIUM SERPL-SCNC: 135 MMOL/L (ref 135–145)
SODIUM SERPL-SCNC: 136 MMOL/L (ref 135–145)
SYSTOLIC BLOOD PRESSURE - MUSE: NORMAL MMHG
T AXIS - MUSE: 42 DEGREES
V CHOLERAE DNA SPEC QL NAA+PROBE: NEGATIVE
VENTRICULAR RATE- MUSE: 127 BPM
VIBRIO DNA SPEC NAA+PROBE: NEGATIVE
WBC # BLD AUTO: 6.1 10E3/UL (ref 4–11)
WBC # BLD AUTO: 6.1 10E3/UL (ref 4–11)
Y ENTEROCOL DNA STL QL NAA+PROBE: NEGATIVE

## 2024-02-12 PROCEDURE — 200N000001 HC R&B ICU

## 2024-02-12 PROCEDURE — 36415 COLL VENOUS BLD VENIPUNCTURE: CPT | Performed by: INTERNAL MEDICINE

## 2024-02-12 PROCEDURE — 250N000011 HC RX IP 250 OP 636: Performed by: INTERNAL MEDICINE

## 2024-02-12 PROCEDURE — 87507 IADNA-DNA/RNA PROBE TQ 12-25: CPT | Performed by: STUDENT IN AN ORGANIZED HEALTH CARE EDUCATION/TRAINING PROGRAM

## 2024-02-12 PROCEDURE — 82533 TOTAL CORTISOL: CPT | Performed by: STUDENT IN AN ORGANIZED HEALTH CARE EDUCATION/TRAINING PROGRAM

## 2024-02-12 PROCEDURE — 250N000013 HC RX MED GY IP 250 OP 250 PS 637: Performed by: STUDENT IN AN ORGANIZED HEALTH CARE EDUCATION/TRAINING PROGRAM

## 2024-02-12 PROCEDURE — 83735 ASSAY OF MAGNESIUM: CPT | Performed by: STUDENT IN AN ORGANIZED HEALTH CARE EDUCATION/TRAINING PROGRAM

## 2024-02-12 PROCEDURE — 85018 HEMOGLOBIN: CPT | Performed by: STUDENT IN AN ORGANIZED HEALTH CARE EDUCATION/TRAINING PROGRAM

## 2024-02-12 PROCEDURE — 258N000002 HC RX IP 258 OP 250: Performed by: STUDENT IN AN ORGANIZED HEALTH CARE EDUCATION/TRAINING PROGRAM

## 2024-02-12 PROCEDURE — 85007 BL SMEAR W/DIFF WBC COUNT: CPT | Performed by: STUDENT IN AN ORGANIZED HEALTH CARE EDUCATION/TRAINING PROGRAM

## 2024-02-12 PROCEDURE — 999N000127 HC STATISTIC PERIPHERAL IV START W US GUIDANCE

## 2024-02-12 PROCEDURE — 80053 COMPREHEN METABOLIC PANEL: CPT | Performed by: INTERNAL MEDICINE

## 2024-02-12 PROCEDURE — 87493 C DIFF AMPLIFIED PROBE: CPT | Performed by: STUDENT IN AN ORGANIZED HEALTH CARE EDUCATION/TRAINING PROGRAM

## 2024-02-12 PROCEDURE — 250N000011 HC RX IP 250 OP 636: Performed by: STUDENT IN AN ORGANIZED HEALTH CARE EDUCATION/TRAINING PROGRAM

## 2024-02-12 PROCEDURE — 82803 BLOOD GASES ANY COMBINATION: CPT

## 2024-02-12 PROCEDURE — 85027 COMPLETE CBC AUTOMATED: CPT | Performed by: STUDENT IN AN ORGANIZED HEALTH CARE EDUCATION/TRAINING PROGRAM

## 2024-02-12 PROCEDURE — 99233 SBSQ HOSP IP/OBS HIGH 50: CPT | Performed by: STUDENT IN AN ORGANIZED HEALTH CARE EDUCATION/TRAINING PROGRAM

## 2024-02-12 PROCEDURE — 258N000003 HC RX IP 258 OP 636: Performed by: INTERNAL MEDICINE

## 2024-02-12 PROCEDURE — 83036 HEMOGLOBIN GLYCOSYLATED A1C: CPT | Performed by: INTERNAL MEDICINE

## 2024-02-12 PROCEDURE — 99222 1ST HOSP IP/OBS MODERATE 55: CPT | Performed by: INTERNAL MEDICINE

## 2024-02-12 PROCEDURE — 83605 ASSAY OF LACTIC ACID: CPT | Performed by: STUDENT IN AN ORGANIZED HEALTH CARE EDUCATION/TRAINING PROGRAM

## 2024-02-12 PROCEDURE — 85027 COMPLETE CBC AUTOMATED: CPT | Performed by: INTERNAL MEDICINE

## 2024-02-12 PROCEDURE — 82040 ASSAY OF SERUM ALBUMIN: CPT | Performed by: INTERNAL MEDICINE

## 2024-02-12 PROCEDURE — 36415 COLL VENOUS BLD VENIPUNCTURE: CPT | Performed by: STUDENT IN AN ORGANIZED HEALTH CARE EDUCATION/TRAINING PROGRAM

## 2024-02-12 PROCEDURE — 99291 CRITICAL CARE FIRST HOUR: CPT | Performed by: ANESTHESIOLOGY

## 2024-02-12 PROCEDURE — 258N000003 HC RX IP 258 OP 636: Performed by: STUDENT IN AN ORGANIZED HEALTH CARE EDUCATION/TRAINING PROGRAM

## 2024-02-12 PROCEDURE — 83605 ASSAY OF LACTIC ACID: CPT | Performed by: INTERNAL MEDICINE

## 2024-02-12 PROCEDURE — 84100 ASSAY OF PHOSPHORUS: CPT | Performed by: STUDENT IN AN ORGANIZED HEALTH CARE EDUCATION/TRAINING PROGRAM

## 2024-02-12 PROCEDURE — 80048 BASIC METABOLIC PNL TOTAL CA: CPT | Performed by: INTERNAL MEDICINE

## 2024-02-12 PROCEDURE — 250N000009 HC RX 250: Performed by: STUDENT IN AN ORGANIZED HEALTH CARE EDUCATION/TRAINING PROGRAM

## 2024-02-12 RX ORDER — POTASSIUM CHLORIDE 7.45 MG/ML
10 INJECTION INTRAVENOUS
Status: COMPLETED | OUTPATIENT
Start: 2024-02-12 | End: 2024-02-12

## 2024-02-12 RX ORDER — ACETAMINOPHEN 650 MG/1
650 SUPPOSITORY RECTAL EVERY 4 HOURS PRN
Status: DISCONTINUED | OUTPATIENT
Start: 2024-02-12 | End: 2024-02-14 | Stop reason: HOSPADM

## 2024-02-12 RX ORDER — NALOXONE HYDROCHLORIDE 0.4 MG/ML
0.2 INJECTION, SOLUTION INTRAMUSCULAR; INTRAVENOUS; SUBCUTANEOUS
Status: DISCONTINUED | OUTPATIENT
Start: 2024-02-12 | End: 2024-02-14 | Stop reason: HOSPADM

## 2024-02-12 RX ORDER — LIDOCAINE 40 MG/G
CREAM TOPICAL
Status: DISCONTINUED | OUTPATIENT
Start: 2024-02-12 | End: 2024-02-12

## 2024-02-12 RX ORDER — ONDANSETRON 2 MG/ML
4 INJECTION INTRAMUSCULAR; INTRAVENOUS EVERY 6 HOURS PRN
Status: DISCONTINUED | OUTPATIENT
Start: 2024-02-12 | End: 2024-02-14 | Stop reason: HOSPADM

## 2024-02-12 RX ORDER — PROCHLORPERAZINE 25 MG
12.5 SUPPOSITORY, RECTAL RECTAL EVERY 12 HOURS PRN
Status: DISCONTINUED | OUTPATIENT
Start: 2024-02-12 | End: 2024-02-14 | Stop reason: HOSPADM

## 2024-02-12 RX ORDER — HYDROMORPHONE HCL IN WATER/PF 6 MG/30 ML
0.4 PATIENT CONTROLLED ANALGESIA SYRINGE INTRAVENOUS
Status: DISCONTINUED | OUTPATIENT
Start: 2024-02-12 | End: 2024-02-14 | Stop reason: HOSPADM

## 2024-02-12 RX ORDER — MAGNESIUM SULFATE HEPTAHYDRATE 40 MG/ML
4 INJECTION, SOLUTION INTRAVENOUS ONCE
Status: COMPLETED | OUTPATIENT
Start: 2024-02-12 | End: 2024-02-12

## 2024-02-12 RX ORDER — NICOTINE POLACRILEX 4 MG
15-30 LOZENGE BUCCAL
Status: DISCONTINUED | OUTPATIENT
Start: 2024-02-12 | End: 2024-02-14 | Stop reason: HOSPADM

## 2024-02-12 RX ORDER — PROCHLORPERAZINE MALEATE 5 MG
5 TABLET ORAL EVERY 6 HOURS PRN
Status: DISCONTINUED | OUTPATIENT
Start: 2024-02-12 | End: 2024-02-14 | Stop reason: HOSPADM

## 2024-02-12 RX ORDER — NOREPINEPHRINE BITARTRATE 0.02 MG/ML
.01-.6 INJECTION, SOLUTION INTRAVENOUS CONTINUOUS
Status: DISCONTINUED | OUTPATIENT
Start: 2024-02-12 | End: 2024-02-12

## 2024-02-12 RX ORDER — LIDOCAINE 40 MG/G
CREAM TOPICAL
Status: DISCONTINUED | OUTPATIENT
Start: 2024-02-12 | End: 2024-02-14 | Stop reason: HOSPADM

## 2024-02-12 RX ORDER — NALOXONE HYDROCHLORIDE 0.4 MG/ML
0.4 INJECTION, SOLUTION INTRAMUSCULAR; INTRAVENOUS; SUBCUTANEOUS
Status: DISCONTINUED | OUTPATIENT
Start: 2024-02-12 | End: 2024-02-14 | Stop reason: HOSPADM

## 2024-02-12 RX ORDER — PIPERACILLIN SODIUM, TAZOBACTAM SODIUM 3; .375 G/15ML; G/15ML
3.38 INJECTION, POWDER, LYOPHILIZED, FOR SOLUTION INTRAVENOUS EVERY 6 HOURS
Status: DISCONTINUED | OUTPATIENT
Start: 2024-02-12 | End: 2024-02-14 | Stop reason: HOSPADM

## 2024-02-12 RX ORDER — HYDROMORPHONE HCL IN WATER/PF 6 MG/30 ML
0.2 PATIENT CONTROLLED ANALGESIA SYRINGE INTRAVENOUS
Status: DISCONTINUED | OUTPATIENT
Start: 2024-02-12 | End: 2024-02-14 | Stop reason: HOSPADM

## 2024-02-12 RX ORDER — AMOXICILLIN 250 MG
1 CAPSULE ORAL 2 TIMES DAILY PRN
Status: DISCONTINUED | OUTPATIENT
Start: 2024-02-12 | End: 2024-02-14 | Stop reason: HOSPADM

## 2024-02-12 RX ORDER — PANTOPRAZOLE SODIUM 40 MG/1
40 TABLET, DELAYED RELEASE ORAL DAILY
Status: DISCONTINUED | OUTPATIENT
Start: 2024-02-12 | End: 2024-02-14 | Stop reason: HOSPADM

## 2024-02-12 RX ORDER — ROPIVACAINE IN 0.9% SOD CHL/PF 0.1 %
.03-.125 PLASTIC BAG, INJECTION (ML) EPIDURAL CONTINUOUS
Status: DISCONTINUED | OUTPATIENT
Start: 2024-02-12 | End: 2024-02-13

## 2024-02-12 RX ORDER — NICOTINE POLACRILEX 4 MG
15-30 LOZENGE BUCCAL
Status: DISCONTINUED | OUTPATIENT
Start: 2024-02-12 | End: 2024-02-12

## 2024-02-12 RX ORDER — AZITHROMYCIN 500 MG/5ML
500 INJECTION, POWDER, LYOPHILIZED, FOR SOLUTION INTRAVENOUS EVERY 24 HOURS
Status: DISCONTINUED | OUTPATIENT
Start: 2024-02-12 | End: 2024-02-14 | Stop reason: HOSPADM

## 2024-02-12 RX ORDER — AMOXICILLIN 250 MG
2 CAPSULE ORAL 2 TIMES DAILY PRN
Status: DISCONTINUED | OUTPATIENT
Start: 2024-02-12 | End: 2024-02-14 | Stop reason: HOSPADM

## 2024-02-12 RX ORDER — NITROGLYCERIN 0.4 MG/1
0.4 TABLET SUBLINGUAL EVERY 5 MIN PRN
Status: DISCONTINUED | OUTPATIENT
Start: 2024-02-12 | End: 2024-02-14 | Stop reason: HOSPADM

## 2024-02-12 RX ORDER — DEXTROSE MONOHYDRATE 25 G/50ML
25-50 INJECTION, SOLUTION INTRAVENOUS
Status: DISCONTINUED | OUTPATIENT
Start: 2024-02-12 | End: 2024-02-14 | Stop reason: HOSPADM

## 2024-02-12 RX ORDER — CALCIUM CARBONATE 500 MG/1
1000 TABLET, CHEWABLE ORAL 4 TIMES DAILY PRN
Status: DISCONTINUED | OUTPATIENT
Start: 2024-02-12 | End: 2024-02-14 | Stop reason: HOSPADM

## 2024-02-12 RX ORDER — DEXTROSE MONOHYDRATE 25 G/50ML
25-50 INJECTION, SOLUTION INTRAVENOUS
Status: DISCONTINUED | OUTPATIENT
Start: 2024-02-12 | End: 2024-02-12

## 2024-02-12 RX ORDER — ONDANSETRON 4 MG/1
4 TABLET, ORALLY DISINTEGRATING ORAL EVERY 6 HOURS PRN
Status: DISCONTINUED | OUTPATIENT
Start: 2024-02-12 | End: 2024-02-14 | Stop reason: HOSPADM

## 2024-02-12 RX ORDER — HYDROMORPHONE HYDROCHLORIDE 2 MG/1
2 TABLET ORAL EVERY 4 HOURS PRN
Status: DISCONTINUED | OUTPATIENT
Start: 2024-02-12 | End: 2024-02-14 | Stop reason: HOSPADM

## 2024-02-12 RX ORDER — ACETAMINOPHEN 325 MG/1
650 TABLET ORAL EVERY 4 HOURS PRN
Status: DISCONTINUED | OUTPATIENT
Start: 2024-02-12 | End: 2024-02-14 | Stop reason: HOSPADM

## 2024-02-12 RX ADMIN — PIPERACILLIN AND TAZOBACTAM 3.38 G: 3; .375 INJECTION, POWDER, FOR SOLUTION INTRAVENOUS at 02:39

## 2024-02-12 RX ADMIN — PIPERACILLIN AND TAZOBACTAM 3.38 G: 3; .375 INJECTION, POWDER, FOR SOLUTION INTRAVENOUS at 20:37

## 2024-02-12 RX ADMIN — SODIUM CHLORIDE, POTASSIUM CHLORIDE, SODIUM LACTATE AND CALCIUM CHLORIDE 500 ML: 600; 310; 30; 20 INJECTION, SOLUTION INTRAVENOUS at 02:01

## 2024-02-12 RX ADMIN — ACETAMINOPHEN 650 MG: 325 TABLET, FILM COATED ORAL at 07:52

## 2024-02-12 RX ADMIN — PROCHLORPERAZINE EDISYLATE 5 MG: 5 INJECTION INTRAMUSCULAR; INTRAVENOUS at 08:52

## 2024-02-12 RX ADMIN — POTASSIUM CHLORIDE: 2 INJECTION, SOLUTION, CONCENTRATE INTRAVENOUS at 08:56

## 2024-02-12 RX ADMIN — POTASSIUM CHLORIDE 10 MEQ: 7.46 INJECTION, SOLUTION INTRAVENOUS at 14:32

## 2024-02-12 RX ADMIN — ONDANSETRON 4 MG: 2 INJECTION INTRAMUSCULAR; INTRAVENOUS at 06:29

## 2024-02-12 RX ADMIN — AZITHROMYCIN MONOHYDRATE 500 MG: 500 INJECTION, POWDER, LYOPHILIZED, FOR SOLUTION INTRAVENOUS at 16:25

## 2024-02-12 RX ADMIN — PIPERACILLIN AND TAZOBACTAM 3.38 G: 3; .375 INJECTION, POWDER, FOR SOLUTION INTRAVENOUS at 13:35

## 2024-02-12 RX ADMIN — PANTOPRAZOLE SODIUM 40 MG: 40 TABLET, DELAYED RELEASE ORAL at 10:31

## 2024-02-12 RX ADMIN — MAGNESIUM SULFATE 4 G: 4 INJECTION INTRAVENOUS at 11:07

## 2024-02-12 RX ADMIN — POTASSIUM CHLORIDE: 2 INJECTION, SOLUTION, CONCENTRATE INTRAVENOUS at 23:27

## 2024-02-12 RX ADMIN — SODIUM CHLORIDE 500 ML: 9 INJECTION, SOLUTION INTRAVENOUS at 09:44

## 2024-02-12 RX ADMIN — PIPERACILLIN AND TAZOBACTAM 3.38 G: 3; .375 INJECTION, POWDER, FOR SOLUTION INTRAVENOUS at 09:06

## 2024-02-12 RX ADMIN — POTASSIUM CHLORIDE 10 MEQ: 7.46 INJECTION, SOLUTION INTRAVENOUS at 11:21

## 2024-02-12 ASSESSMENT — ACTIVITIES OF DAILY LIVING (ADL)
ADLS_ACUITY_SCORE: 35
ADLS_ACUITY_SCORE: 32
ADLS_ACUITY_SCORE: 35
ADLS_ACUITY_SCORE: 32
ADLS_ACUITY_SCORE: 32
ADLS_ACUITY_SCORE: 30
ADLS_ACUITY_SCORE: 32
ADLS_ACUITY_SCORE: 32
ADLS_ACUITY_SCORE: 30
ADLS_ACUITY_SCORE: 30
DEPENDENT_IADLS:: INDEPENDENT
ADLS_ACUITY_SCORE: 32
ADLS_ACUITY_SCORE: 30

## 2024-02-12 NOTE — ED NOTES
Pt awake, alert, and sitting up in bed eating popsicles in bed after 4 liters of fluid and Istat blood gas improved.

## 2024-02-12 NOTE — PROGRESS NOTES
Assumed care of the patient 01:00 - 0319 AM 02/12/2024    Alert and oriented x 4 . Afebrile . Very Low systolic and diastolic BP .  MAP wnl at the end of the end of the encounter .  C/C Mild Abdominal cramp and dizziness and blurring of vision . Had watery brown stool x 2 . No bleeding rectum or vomiting of blood . Rapid response team activated , fluid and electrolyte replaced , VS followed closely .and  Stool specimen collected . Ambulated to bathroom with belt and walker x 1 assist . Lactic acid 1.9  .

## 2024-02-12 NOTE — PROGRESS NOTES
Cross cover notified of patient with a blood pressure of 83/52 and reporting dizziness.  She was admitted this evening for hypovolemic shock and lactic acidosis secondary to suspected acute gastroenteritis resulting in severe diarrhea after eating raw crab.  Blood pressure has been in the 80-90 systolic range and she is mildly tachycardic.  Low-grade fever in the ER.  Initial labs showed a lactic acid of 5.6 with bicarb of 15 and sodium of 133.  CT of the ab pelvis showed enteritis and colitis.  She is on empiric IV Zosyn and I believe she has not received 3 or 4 L of IV crystalloid.  Tachycardia improved some previously and blood pressure had gone above 100 systolic with lactic acid down to 2.2 on most recent recheck.  Has had ongoing diarrhea here.  Spoke with patient's RN, she just arrived to the third floor.  -Give 500 mL LR bolus now  -Stat labs including hemoglobin, BMP, and lactic acid  -Depending on lab results and if no response to IV fluid bolus, then may need to transfer to ICU for norepinephrine initiation.  Will follow-up soon    ADDENDUM:  While receiving the 500 mL LR bolus and RRT was called as her blood pressure went to 78/52.  I arrived at the bedside and she was in the Trendelenburg position.  She was alert and oriented.  She does report some mild lightheadedness although it is better than when she initially arrived in the ER.  She does feel slightly short of breath and was breathing faster earlier which was more likely from the lactic acidosis.  She denies any chest pain.  She has not had any hematochezia or melena, stools currently almost green in color.  She actually does not appear acutely ill at the bedside, but she is mildly tachycardic and has some minimal epigastric pain to palpation.  She does not have any lower extreme edema.  No focal crackles on auscultation.  The stat labs are ordered also just returned showing lactic acid normalized at 1.9 which is quite reassuring despite the  ongoing hypotension.  Her hemoglobin is down to 13.5 from 16.9, but she did receive 4.5 L of fluid and I suspect this is dilutional especially without her description of any bloody stools.  -She will finish the originally ordered 500 mL LR bolus then go back to the sodium/bicarb/potassium containing IV fluids at 100 mL/h  -Recheck blood pressure in 1 hour  -Discussed with the patient to notify her nurse if she is developing any worsening lightheadedness, shortness of breath, change in mental status (daughter at the bedside), or worsening abdominal pain  -Will recheck lactic acid, CBC, and BMP with morning lab draw at about 6 AM  -Will check type and screen although although I have low suspicion for bleeding at this time

## 2024-02-12 NOTE — ED PROVIDER NOTES
"  History     Chief Complaint:  Nausea, Vomiting, & Diarrhea       HPI   Charlette Rodgers is a 68 year old female with a history of DM on metformin and semaglutide, hypertension, hyperlipidemia, who presented to the emergency room with a chief complaint of nausea vomiting and diarrhea as well as fever.  Patient reports that she was in her usual state of health until approximately midnight last night when she developed the symptoms.  During the day yesterday she did eat raw crab twice.  She denies any blood in her vomit or dark and tarry stools, has had greater than 20 episodes of diarrhea today and multiple episodes of emesis.  She reports abdominal cramping that occurs prior to needing to have bowel movement or vomit.    Independent Historian:    Daughter - They report patient was shaking and seemed to be paler than usual    Review of External Notes:  None    Allergies:  Dulaglutide  Penicillins     Physical Exam   Patient Vitals for the past 24 hrs:   BP Temp Temp src Pulse Resp SpO2 Height Weight   02/11/24 2152 -- -- -- -- -- -- 1.575 m (5' 2\") 63.5 kg (140 lb)   02/11/24 2150 -- -- -- 118 -- -- -- --   02/11/24 2146 (!) 119/95 -- -- 117 (!) 40 97 % -- --   02/11/24 2144 -- -- -- (!) 121 (!) 39 98 % -- --   02/11/24 2141 (!) 115/94 -- -- (!) 125 (!) 35 98 % -- --   02/11/24 2055 97/57 -- -- 106 -- -- -- --   02/11/24 2050 94/52 -- -- 105 28 95 % -- --   02/11/24 2045 109/42 -- -- 106 24 94 % -- --   02/11/24 2033 (!) 78/58 -- -- 108 (!) 32 94 % -- --   02/11/24 2018 93/47 -- -- 102 30 94 % -- --   02/11/24 2015 93/54 -- -- 102 26 94 % -- --   02/11/24 2010 93/54 -- -- 103 10 92 % -- --   02/11/24 2003 (!) 87/53 -- -- 105 23 96 % -- --   02/11/24 1948 91/48 -- -- 102 26 93 % -- --   02/11/24 1933 (!) 82/51 -- -- 106 28 92 % -- --   02/11/24 1930 (!) 84/52 -- -- 106 -- -- -- --   02/11/24 1921 (!) 87/52 -- -- 105 30 94 % -- --   02/11/24 1900 (!) 84/50 -- -- 109 27 95 % -- --   02/11/24 1854 (!) 74/52 -- -- 109 25 " 95 % -- --   02/11/24 1851 (!) 88/54 -- -- 112 25 98 % -- --   02/11/24 1840 (!) 83/53 -- -- 112 26 95 % -- --   02/11/24 1835 (!) 86/57 -- -- 114 24 95 % -- --   02/11/24 1832 (!) 87/51 -- -- 113 (!) 33 97 % -- --   02/11/24 1831 -- -- -- 116 (!) 36 96 % -- --   02/11/24 1830 (!) 79/49 -- -- 118 25 95 % -- --   02/11/24 1742 96/66 98.2  F (36.8  C) Oral (!) 136 22 99 % -- 63.5 kg (140 lb)        Physical Exam  General: Overall stable and nontoxic appearing  HEENT: Conjunctivae clear, no scleral icterus, mucous membranes moist  Neuro: Alert, moving all extremities equally with intention  CV: Regular rate and rhythm, radial and DP pulses equal  Respiratory: Tachypneic however lungs clear to auscultation bilaterally   Abdomen: Soft, without rigidity or rebound throughout   No focal RLQ tenderness   No focal RUQ tenderness   Some mild tenderness with palpation in the epigastric region   MSK: No lower extremity swelling or tenderness     Emergency Department Course   ECG  Electrocardiogram  ECG taken at 1749, ECG interpreted at 1752 by myself  Sinus tachycardia with ST depressions in V3-V5  Rate 127 bpm. PA interval 130. QRS duration 78. QTc 465       Laboratory: Imaging:   Labs Ordered and Resulted from Time of ED Arrival to Time of ED Departure   COMPREHENSIVE METABOLIC PANEL - Abnormal       Result Value    Sodium 133 (*)     Potassium 4.2      Carbon Dioxide (CO2) 15 (*)     Anion Gap 22 (*)     Urea Nitrogen 29.9 (*)     Creatinine 0.90      GFR Estimate 69      Calcium 9.7      Chloride 96 (*)     Glucose 293 (*)     Alkaline Phosphatase 43      AST 31      ALT 38      Protein Total 6.9      Albumin 4.3      Bilirubin Total 1.4 (*)    CBC WITH PLATELETS AND DIFFERENTIAL - Abnormal    WBC Count 5.8      RBC Count 5.52 (*)     Hemoglobin 16.9 (*)     Hematocrit 52.3 (*)     MCV 95      MCH 30.6      MCHC 32.3      RDW 13.3      Platelet Count 192      % Neutrophils 76      % Lymphocytes 12      % Monocytes 12      %  Eosinophils 0      % Basophils 0      % Immature Granulocytes 0      NRBCs per 100 WBC 0      Absolute Neutrophils 4.4      Absolute Lymphocytes 0.7 (*)     Absolute Monocytes 0.7      Absolute Eosinophils 0.0      Absolute Basophils 0.0      Absolute Immature Granulocytes 0.0      Absolute NRBCs 0.0     ISTAT GASES LACTATE VENOUS POCT - Abnormal    Lactic Acid POCT 5.6 (*)     Bicarbonate Venous POCT 18 (*)     O2 Sat, Venous POCT 51 (*)     pCO2 Venous POCT 30 (*)     pH Venous POCT 7.40      pO2 Venous POCT 26     KETONE BETA-HYDROXYBUTYRATE QUANTITATIVE, RAPID - Abnormal    Ketone (Beta-Hydroxybutyrate) Quantitative 0.60 (*)    ISTAT GASES LACTATE VENOUS POCT - Abnormal    Lactic Acid POCT 4.0 (*)     Bicarbonate Venous POCT 17 (*)     O2 Sat, Venous POCT 40 (*)     pCO2 Venous POCT 35 (*)     pH Venous POCT 7.30 (*)     pO2 Venous POCT 25     GLUCOSE BY METER - Abnormal    GLUCOSE BY METER POCT 148 (*)    TROPONIN T, HIGH SENSITIVITY - Normal    Troponin T, High Sensitivity 8     INFLUENZA A/B, RSV, & SARS-COV2 PCR - Normal    Influenza A PCR Negative      Influenza B PCR Negative      RSV PCR Negative      SARS CoV2 PCR Negative     LIPASE - Normal    Lipase 14     ROUTINE UA WITH MICROSCOPIC REFLEX TO CULTURE   ISTAT GASES LACTATE VENOUS POCT   LACTIC ACID WHOLE BLOOD   BLOOD CULTURE   BLOOD CULTURE   ENTERIC BACTERIA AND VIRUS PANEL BY PCR     CT Abdomen Pelvis w Contrast   Final Result   IMPRESSION:    1.  Severe nonspecific acute enteritis and milder acute colitis. Vasculature appears to be normal for this is most likely infectious with inflammatory bowel disease flare not excluded.   2.  Moderate diffuse hepatic steatosis.    3.  Mild bile duct dilatation with no radiodense stone or mass consistent with reservoir effect from prior cholecystectomy.              Procedures       Emergency Department Course & Assessments:             Interventions:  Medications   norepinephrine (LEVOPHED) 4 mg in  mL  PERIPHERAL infusion (has no administration in time range)   lactated ringers BOLUS 1,000 mL (has no administration in time range)   NaCl 0.45 % 1,000 mL with potassium chloride 20 mEq/L, sodium bicarbonate 50 mEq/L infusion (has no administration in time range)   sodium chloride 0.9% BOLUS 1,000 mL (0 mLs Intravenous Stopped 2/11/24 2051)   ondansetron (ZOFRAN) injection 4 mg (4 mg Intravenous $Given 2/11/24 1829)   piperacillin-tazobactam (ZOSYN) 3.375 g vial to attach to  mL bag (0 g Intravenous Stopped 2/11/24 2050)   dicyclomine (BENTYL) tablet 20 mg (20 mg Oral $Given 2/11/24 2051)   ondansetron (ZOFRAN) injection 4 mg (4 mg Intravenous $Given 2/11/24 2050)   vancomycin (VANCOCIN) 1,500 mg in 0.9% NaCl 250 mL intermittent infusion (1,500 mg Intravenous $New Bag 2/11/24 2054)   sodium chloride 0.9% BOLUS 1,000 mL (1,000 mLs Intravenous $New Bag 2/11/24 2050)   famotidine (PEPCID) injection 20 mg (20 mg Intravenous $Given 2/11/24 2049)   CT scan flush (58 mLs Intravenous $Given 2/11/24 2119)   iopamidol (ISOVUE-370) solution 500 mL (71 mLs Intravenous $Given 2/11/24 2119)        Assessments, Independent Interpretation, Consult/Discussion of ManagementTests:  ED Course as of 02/11/24 2227   Sun Feb 11, 2024   1831 Lactic Acid POCT(!!): 5.6   1835 pH Venous POCT: 7.40   1836 Glucose(!): 293   1836 Anion Gap(!): 22   1851 Reassessed, mentating well.   2013 Ketone Quantitative(!): 0.60   2033 MAP now 67   2048 Reassesed. Lactate now 4.0. Will go to CT scan    2150 Patient had large bowel movement and episodes of emesis.        Social Determinants of Health affecting care:  None    Disposition:  The patient was admitted to the hospital under the care of Dr. Chavis.     Impression & Plan    CMS Diagnoses: The patient has signs of Septic Shock  The patient has signs of septic shock as evidenced by:  1. Presence of Sepsis, AND  2. Lactic Acidosis with value greater than or equal to 4    Time septic shock diagnosis  "confirmed = 1825  02/11/24   as this was the time when Lactate was resulted and the level was greater than or equal to 4    3 Hour Septic Shock Bundle Completion:  1. Initial Lactic Acid Result:   Recent Labs   Lab Test 02/11/24 2043 02/11/24  1825   LACT 4.0* 5.6*     2. Blood Cultures before Antibiotics: Yes  3. Broad Spectrum Antibiotics Administered:  yes       Anti-infectives (From admission through now)      Start     Dose/Rate Route Frequency Ordered Stop    02/11/24 2005  vancomycin (VANCOCIN) 1,500 mg in 0.9% NaCl 250 mL intermittent infusion         1,500 mg  over 90 Minutes Intravenous ONCE 02/11/24 2002 02/11/24 2224 02/11/24 1900  piperacillin-tazobactam (ZOSYN) 3.375 g vial to attach to  mL bag        Note to Pharmacy: For [facility], [facility] and [facility]: For Zosyn-naive patients, use the \"Zosyn initial dose + extended infusion\" order panel.    3.375 g  over 30 Minutes Intravenous ONCE 02/11/24 1857 02/11/24 2050            4. IF 30 mL/kg bolus criteria met based on:  -Lactate > 4  OR  -Initial Hypotension:  Definition:  2 low BP readings (SBP <90, MAP <65, or decrease > 40 from baseline due to infection) within 3 hrs of each other during the time period of 6 hrs before and 3 hrs  after time zero  THEN: Fluid volume administered in ED:  Full 30 mL/kg bolus given (see amount below).    BMI Readings from Last 1 Encounters:   02/11/24 25.61 kg/m      30 mL/kg fluids based on weight: 1,910 mL  30 mL/kg fluids based on IBW (must be >= 60 inches tall): 1,500 mL    Septic Shock reassessment:  1. Repeat Lactic Acid Level: 4.0  2.  MAP was 64 and patient mentating appropriately  Critical Care  Critical Care is defined as an illness or injury that acutely impairs one or more vital organ systems such that there is a high probability of imminent or life-threatening deterioration in the patient's condition and that the failure to initiate these interventions on an urgent basis would likely result in sudden, clinically " significant or life-threatening deterioration in the patient's condition.    The critical condition was: Sepsis, severe or septic shock    Critical interventions performed or strongly considered: Infusion: naloxone, vasopressors, insulin, chronotropics, ionotropics, antiepileptics    Critical care time was 35 minutes exclusive of time spent on separately billable procedures.    For purposes of time:  Procedures included in CC time: interpretation of NICOM, CXR, SpO2, VBG/ABG, interpretation of physiologic data, OG placement, temporary transcutaneous/transvenous pacing, ventilator management  Common separately billable procedures (not included in CC time): CPR, wound repair, endotracheal intubation, central line placement, intraosseous placement, tube thoracostomy, temporary transvenous pacemaker, EKG, electrical cardioversion    I attest to having performed a repeat sepsis exam and assessment of perfusion at  and the results demonstrate improved perfusion.    Code Status: No Order    Medical Decision Makin-year-old female with history of diabetes on metformin and semaglutide, hypertension, who presented to the emergency department with nausea vomiting and diarrhea in the setting of eating raw crabs yesterday  Patient's blood pressure was soft when she initially arrived however overall stable mentating appropriately.  Her abdomen was overall soft and did not have any focal tenderness to palpation.  Suspected that her symptoms are likely due to an infectious etiology as prior to eating the raw crabs she was in her usual state of health, I was less suspicious for mesenteric ischemia as an etiology.  She was initially tachycardic, EKG demonstrated sinus tachycardia and this resolved after fluid administration.  Suspect the ST depressions are likely rate related as patient was not having any chest pain whatsoever and her presentation does not fit with ACS.  Troponin was obtained and this was 8 again this is  very reassuring against ACS as symptoms have been present for more than 8 hours.  Lactate was elevated to 5.6 and improved to 2:04 liters of fluid administration.  Patient was closely reassessed and throughout this time, her blood pressures did dip somewhat however improved after further fluid administration and I did feel that she was likely still quite volume down in the setting of significant GI losses, as she was fluid responsive did not start norepinephrine peripherally.  Blood cultures were drawn and antibiotics were started given that she did flag for sepsis.  CT abdomen pelvis which demonstrated acute enteritis diffusely with some colitis. No signs of GI bleed as etiology of presentation.  Patient was tachypneic however likely this was in the setting of compensatory response to the lactic acidosis, she did not feel short of breath her lungs were clear I did not think that pneumonia was part of the process either. She was discussed with Dr Chavis from the hospitalist service who kindly accepted her for admission.       Diagnosis:    ICD-10-CM    1. Severe sepsis (H)  A41.9     R65.20       2. Infectious enteritis, unspecified infectious agent  A09       3. Vomiting and diarrhea  R11.10     R19.7       4. Lactic acidosis  E87.20       5. Hypovolemia  E86.1       6. History of diabetes mellitus  Z86.39            Discharge Medications:  New Prescriptions    No medications on file          2/11/2024   Shani Ball MD Wu Klasek, Connie, MD  02/11/24 [id number]

## 2024-02-12 NOTE — ED NOTES
GastrointestinalGastrointestinal WDL: all (pt comes in with severe diarrhea, weakness, dizziness, blue fingertips and lips starting last night. pt ate raw crab yesterday twice.)Stool Consistency: watery; liquidBowel Sounds: All QuadrantsAll Quadrants Bowel Sounds: audibleAdditional Documentation: Bowel Sounds (Row)

## 2024-02-12 NOTE — CONSULTS
[facility]    Infectious Disease Consultation     Date of Admission:  2/11/2024  Date of Consult (When I saw the patient): 02/12/24    Assessment & Plan   Charlette Rodgers is a 68 year old who was admitted on 2/11/2024.     Impression: 1 68-year-old healthy female, underlying diabetes but no history of GI illness, acute gastroenteritis with some colitis component, hypotension, all occurring hours after eating raw crab that it marinated in the refrigerator for many days, if that is the source the concern would be vibrio cholera, enteric panel is negative and should have high sensitivity for that organism  2 diabetes mellitus    REC1 Zosyn for now, will add Zithromax for possible vibrio cholera, for the most part likely supportive care, fluids, etc. are more important than antibiotics here        Stewart Naranjo MD    Reason for Consult   Reason for consult: I was asked to evaluate this patient for gastroenteritis.    Primary Care Physician   GAVIN MELGOZA    Chief Complaint   Vomiting and diarrhea    History is obtained from the patient and medical records    History of Present Illness   Charlette Rodgers is a 68 year old female who presents with acute onset of nausea and diarrhea and fever.  Patient had eaten raw crab that she had marinated in the refrigerator.  She is only person to eat this product.  She does this regularly has never gotten ill previously.  It was conventional store-bought crab.  No other obvious exposures no other recent travel no one else has been ill around her.  At admission had significant enteritis and some colitis on imaging.  Some fever with negative micro so far including enteric panel completely negative.    Past Medical History   I have reviewed this patient's medical history and updated it with pertinent information if needed.   No past medical history on file.    Past Surgical History   I have reviewed this patient's surgical history and updated it with pertinent  information if needed.  No past surgical history on file.    Prior to Admission Medications   Prior to Admission Medications   Prescriptions Last Dose Informant Patient Reported? Taking?   EPINEPHrine (ANY BX GENERIC EQUIV) 0.3 MG/0.3ML injection 2-pack   No No   Sig: Inject 0.3 mLs (0.3 mg) into the muscle as needed for anaphylaxis May repeat one time in 5-15 minutes if response to initial dose is inadequate.   FARXIGA 10 MG TABS tablet 2/11/2024  Yes Yes   Sig: Take 10 mg by mouth daily   Semaglutide, 1 MG/DOSE, (OZEMPIC) 4 MG/3ML pen 2/4/2024  Yes Yes   Sig: Inject 1 mg Subcutaneous every 7 days Sunday   alendronate (FOSAMAX) 70 MG tablet 2/10/2024  Yes Yes   Sig: Take 70 mg by mouth every 7 days Saturday   gabapentin (NEURONTIN) 600 MG tablet 2/11/2024 at am  Yes Yes   Sig: Take 600 mg by mouth 3 times daily   lisinopril (ZESTRIL) 20 MG tablet 2/11/2024  Yes Yes   Sig: Take 20 mg by mouth daily   metFORMIN (GLUCOPHAGE) 1000 MG tablet 2/11/2024 at am  Yes Yes   Sig: Take 1 tablet by mouth 2 times daily   omeprazole (PRILOSEC) 20 MG DR capsule 2/11/2024  Yes Yes   Sig: Take 20 mg by mouth daily   simvastatin (ZOCOR) 40 MG tablet 2/10/2024  Yes Yes   Sig: Take 40 mg by mouth at bedtime      Facility-Administered Medications: None     Allergies   Allergies   Allergen Reactions    Dulaglutide Rash     LOCAL REACTION TO INJECT SITE    Penicillins Rash     Tolerated Zosyn on 2/11/2024       Immunization History   Immunization History   Administered Date(s) Administered    COVID-19 12+ (2023-24) (Pfizer) 11/09/2023    COVID-19 Bivalent 12+ (Pfizer) 10/11/2022, 05/25/2023    COVID-19 MONOVALENT 12+ (Pfizer) 03/10/2021, 03/31/2021, 11/03/2021    COVID-19 Monovalent 12+ (Pfizer 2022) 04/26/2022       Social History   I have reviewed this patient's social history and updated it with pertinent information if needed. Charlette Rodgers      Family History   I have reviewed this patient's family history and updated it with  "pertinent information if needed.   No family history on file.    Review of Systems   The 10 point Review of Systems is negative    Physical Exam   Temp: 97.1  F (36.2  C) Temp src: Temporal BP: 107/70 Pulse: 92   Resp: 25 SpO2: 97 % O2 Device: None (Room air)    Vital Signs with Ranges  Temp:  [97.1  F (36.2  C)-100.1  F (37.8  C)] 97.1  F (36.2  C)  Pulse:  [] 92  Resp:  [10-40] 25  BP: ()/(42-95) 107/70  SpO2:  [90 %-99 %] 97 %  140 lbs 0 oz  Body mass index is 25.61 kg/m .    GENERAL APPEARANCE:  awake in the ICU but cognitions intact, blood pressure improved does not look that toxic or ill mildly tachycardic  EYES: Eyes grossly normal to inspection  NECK: no adenopathy  RESP: lungs clear   CV: regular rates and rhythm  LYMPHATICS: normal ant/post cervical and supraclavicular nodes  ABDOMEN: soft, only mildly tender no palpable abnormality  MS: extremities normal  SKIN: no suspicious lesions or rashes        Data   All laboratory and imaging data in the past 24 hours reviewed  No results for input(s): \"CULT\" in the last 168 hours.  No lab results found.    Invalid input(s): \"UC\"       All cultures:  Recent Labs   Lab 02/11/24 1914 02/11/24  1857   CULTURE No growth after 12 hours No growth after 12 hours      Blood culture:  Results for orders placed or performed during the hospital encounter of 02/11/24   Blood Culture Peripheral Blood    Specimen: Peripheral Blood   Result Value Ref Range    Culture No growth after 12 hours    Blood Culture Arm, Right    Specimen: Arm, Right; Blood   Result Value Ref Range    Culture No growth after 12 hours       Urine culture:  No results found for this or any previous visit.          "

## 2024-02-12 NOTE — PROVIDER NOTIFICATION
Pt hypotensive with BP 82/48 and MAP 55. Pt asymptomatic. MD notified.     No new orders. Continue to monitor BP.

## 2024-02-12 NOTE — PROGRESS NOTES
[facility]    Medicine Progress Note - Hospitalist Service    Date of Admission:  2/11/2024    Assessment & Plan   Charlette Rodgers is a 68 year old female with past medical history of essential hypertension, hyperlipidemia and diabetes.     She ate some raw crab day prior to arrival and has been having loose watery stools since then.  She estimates more than 20 stools on the day of admission.  She has nausea with minimal emesis.  She also complains of mild diffuse abdominal pain but no fever rigors or chills.  She feels very dry and mildly dizzy.  Otherwise alert awake and oriented x 3.     Vitals on presentation: Temperature 98.2  F, heart rate 136/min, blood pressure of 96/66 which dropped down to 78/58, respiratory rate of 22 and oxygen saturation of 99% on room air.     Initial lactate was 5.6.  CMP showed BUN/creatinine of 29.9/0.9 with glucose of 293.  Ketones of 0.6.  WBC 5.8 and hemoglobin 16.9.  VBG with pH/pCO2 of 7.4/30. CT abdomen showed nonspecific acute enteritis with mild acute colitis.     Patient was given NS x 3 L, dose of vancomycin and Zosyn in the ER.    Her labs, HR have improved but her blood pressure has remained persistently low for unclear reasons despite more than enough fluids.  Will transfer to ICU for prn levophed.      Severe acute enteritis, mild colitis  Undifferentiated shock with lactic acidosis:  Ate raw crab and developed profuse diarrhea, nausea, vomiting within 12-24 hours.  20+ stools on the day of discharge.  Denies melena or hematochezia.  Initially, hypotension was thought related to hypovolemia, however, her blood pressure has remained persistently low despite more than double sepsis fluid bolus resuscitation (at least 4.5L).  BP remains 85/53 (MAP 64). CT shows severe non-specific acute enteritis and milder acute colitis.  There is also mild bile duct dilation but suspect due to reservoir effect following cholecystectomy.  Lipase, LFTs normal.  Oddly  enough her WBC is normal.  Hgb stable.  Fortunately her lactic acidosis has resolved.  GI panel negative.    Anaphylaxis has been considered.  She has a history of anaphylaxis in the past with diabetic medication.  However, she commonly eats cooked crab, unclear if raw crab would provoke allergic reaction.  She has mild facial edema following 4.5L fluid resuscitation but does not have swelling of the tongue, throat, hives, itching, etc.    -Consult ID given unclear reason for persistent hypotension.   Unclear if there would be other infection related to raw crab.  Vibrio was negative on stool panel  -Empirically continue Zosyn and follow-up on blood and stool cultures  -Transfer to ICU for prn levophed given adequate fluid resuscitation but MAPs <65  -Plan for peripheral levo for now but if needing persistently, will need to consider PICC/midline placement  -I have ordered a c diff test  -Continue 1/2 NS with 50 mEq sodium bicarbonate and 20 mg potassium chloride at 100 mL/h.  -Check cortisol  -Discussed with ICU doc Dr. Gonzalez     Mixed anion gap and non-anion gap metabolic acidosis:  Anion gap acidosis due to lactic acidosis and mild ketosis and nongap anion gap acidosis due to GI losses and resuscitation with NS.  Add bicarbonate to IV fluid as above.     Diabetes mellitus type 2:  Initial blood glucose of 293, likely stress-induced as on recheck it came down to 148 without any hypoglycemic agents.  Prior to admission on metformin, Farxiga and Ozempic.    -Will hold his medication given his sliding scale insulin.     Essential hypertension:  Prior to admission on lisinopril which will be held.     Hyperlipidemia:  Resume simvastatin on discharge.     GERD:  Continue PPI.     Hemoconcentration due to dehydration:  Expect drop in hemoglobin with IV hydration.          Diet: Combination Diet Clear Liquid    DVT Prophylaxis: Pneumatic Compression Devices  Carlton Catheter: Not present  Lines: None     Cardiac  "Monitoring: ACTIVE order. Indication: ICU  Code Status: Full Code      Clinically Significant Risk Factors Present on Admission           # Hypercalcemia: corrected calcium is >10.1, will monitor as appropriate  # Hypomagnesemia: Lowest Mg = 1.5 mg/dL in last 2 days, will replace as needed  # Anion Gap Metabolic Acidosis: Highest Anion Gap = 22 mmol/L in last 2 days, will monitor and treat as appropriate  # Hypoalbuminemia: Lowest albumin = 3 g/dL at 2/12/2024  8:40 AM, will monitor as appropriate     # Hypertension: Home medication list includes antihypertensive(s)     # DMII: A1C = 7.1 % (Ref range: <5.7 %) within past 6 months    # Overweight: Estimated body mass index is 25.61 kg/m  as calculated from the following:    Height as of this encounter: 1.575 m (5' 2\").    Weight as of this encounter: 63.5 kg (140 lb).       # Financial/Environmental Concerns: none         Disposition Plan      Expected Discharge Date: 02/13/2024      Destination: home with family              Gio HarrellDO  Hospitalist Service  [facility]  Securely message with [employer] (more info)  Text page via [facility] Paging/Directory   ______________________________________________________________________    Interval History   Awake, alert.  Fully oriented.  She had severe hypotension overnight for which an RRT was called.  She was placed in trendelenburg position and given fluids.  Her BP does seem to improve with fluids but then immediately drops.     Physical Exam   Vital Signs: Temp: 97.8  F (36.6  C) Temp src: Oral BP: (!) 85/53 Pulse: 91   Resp: 18 SpO2: 95 % O2 Device: None (Room air)    Weight: 140 lbs 0 oz    General Appearance: Awake, alert.  Fully oriented.  Moderately ill appearing,  face appears flushed.   Respiratory: CTAB.  Normal WOB on RA.  No wheezing.    Cardiovascular: RRR no mrg.  Trace edema to the BLE.   GI: Soft.  TTP in the epigastric region and left abdomen.  No rebound tenderness or guarding. "   Skin: No rashes or lesions on exposed skin.  Mildly flushed and diaphoretic.  No hives, rash, etc.   Other: No stridor.  No tongue swelling.      Medical Decision Making       55 MINUTES SPENT BY ME on the date of service doing chart review, history, exam, documentation & further activities per the note.      Data   ------------------------- PAST 24 HR DATA REVIEWED -----------------------------------------------    I have personally reviewed the following data over the past 24 hrs:    6.1; 6.1  \   13.0; 13.0   / 130 (L); 130 (L)     135 106 17.5 /  138 (H)   3.8 19 (L) 0.53 \     ALT: 39 AST: 40 AP: 27 (L) TBILI: 0.8   ALB: 3.0 (L) TOT PROTEIN: 5.0 (L) LIPASE: 14     Trop: 8 BNP: N/A     TSH: N/A T4: N/A A1C: 7.1 (H)     Procal: N/A CRP: N/A Lactic Acid: 1.0         Imaging results reviewed over the past 24 hrs:   Recent Results (from the past 24 hour(s))   CT Abdomen Pelvis w Contrast    Narrative    EXAM: CT ABDOMEN PELVIS W CONTRAST  LOCATION: [facility]  DATE: 2/11/2024    INDICATION: Abdominal pain, nausea, vomiting, diarrhea and fevers.  COMPARISON: None.  TECHNIQUE: CT scan of the abdomen and pelvis was performed following injection of IV contrast. Multiplanar reformats were obtained. Dose reduction techniques were used.  CONTRAST: 71mL Isovue 370    FINDINGS:   LOWER CHEST: Visualized lungs are clear. No pleural effusion. Heart size normal with no pericardial effusion.  Small esophageal hiatal hernia.    HEPATOBILIARY: Moderate diffuse hepatic steatosis. Liver is otherwise normal. Mild bile duct dilatation with no radiodense stone or mass consistent with reservoir effect from prior cholecystectomy.    PANCREAS: Normal.    SPLEEN: Spleen size normal.    ADRENAL GLANDS: Normal.    KIDNEYS/BLADDER: Kidneys, ureters and bladder are normal.    BOWEL: Mucosal hyperenhancement and severe circumferential submucosal edema and perienteric edema involving the mid and distal jejunum and the  entire ileum as well as mucosal hyperenhancement and milder submucosal edema and pericolic edema affecting the   cecum and ascending colon. Mild mural thickening and liquid stool throughout the remainder of the colon. Findings compatible with a severe nonspecific acute enteritis and milder acute colitis.    Trace amount of free fluid in the abdomen and pelvis. No obstruction, pneumatosis or free air.    LYMPH NODES: No lymphadenopathy.    VASCULATURE: Normal caliber abdominal aorta.   Patent nonstenotic mesenteric and renal arteries. IVC and hepatic, portal, mesenteric, splenic and renal veins patent.    PELVIC ORGANS: No pelvic mass.    MUSCULOSKELETAL: Unremarkable.      Impression    IMPRESSION:   1.  Severe nonspecific acute enteritis and milder acute colitis. Vasculature appears to be normal for this is most likely infectious with inflammatory bowel disease flare not excluded.  2.  Moderate diffuse hepatic steatosis.   3.  Mild bile duct dilatation with no radiodense stone or mass consistent with reservoir effect from prior cholecystectomy.

## 2024-02-12 NOTE — PROGRESS NOTES
Critical Care  Note      02/12/2024    Name: Charlette Rodgers MRN#: [id number]   Age: 68 year old YOB: 1955     [facility] Day# 1  ICU DAY # 1                 Problem List:   Principal Problem:    Sepsis (H)           Summary/Hospital Course:   68-year-old female with past medical history of essential hypertension, hyperlipidemia and type 2 diabetes who presents to the ICU with transient hypotension secondary to hypovolemia secondary to large volume diarrhea secondary to possible food poisoning.  Patient was in her usual state of health when she ingested some raw crabs 1 day prior to admission to the ICU and developed large-volume diarrhea with mild diffuse abdominal pain.  She denies any significant nausea vomiting fever or chills.  She does report diffuse abdominal tenderness and some mild lightheadedness.  She was initially admitted to the floor but due to persistent marginal hypotension she was transferred to the ICU for possible administration of norepinephrine.  When I saw the patient this morning her blood pressure was 103/68 and she was not reporting any lightheadedness.  She had been appropriate fluid resuscitation and aside from mild diffuse abdominal pain and no other complaints.  Review of systems is significant for hypertension and type 2 diabetes requiring metformin  Her social history is noncontributory.        Assessment and plan :     Charlette Rodgers IS a 68 year old female admitted on 2/11/2024 for hypotension secondary to ingestion of raw crabs..   I have personally reviewed the daily labs, imaging studies, cultures and discussed the case with referring physician and consulting physicians.     My assessment and plan by system for this patient is as follows:    Neurology/Psychiatry:   1.  No active issues at this time    Plan  Patient is awake alert with no neurological issues at this time, will minimize benzodiazepines and opioids at this time.  FL) Tylenol for abdominal  pain.    Cardiovascular:   1.Hemodynamics -initially soft blood pressures despite fluid resuscitation  2.Rhythm -normal sinus rhythm  3. Ischemia -no signs of ischemia  Plan  Continue to monitor.  Patient may require peripheral norepinephrine to maintain mean arterial pressure greater than 65.  If patient continues with large-volume diarrhea and becomes hypotensive would recommend trialing albumin to maintain blood pressure.    Pulmonary/Ventilator Management:   1. Airway patent airway  2. Oxygenation/ventilation/mechanics on room air  Plan  -No active issues at this time, would encourage aggressive bronchial hygiene.    GI and Nutrition :   1.  Large-volume diarrhea  2.  Concern for protein calorie malnutrition given hypoalbuminemia  3.  LFTs normal at this time    Plan  -Keep patient n.p.o. for now.    Renal/Fluids/Electrolytes:   1.  Resolving lactic acidosis  2.  Hypokalemia  3.  Appears hypovolemic  4.   Plan  -Lactic acid has improved with volume resuscitation, will continue volume resuscitation as needed.  -No signs of kidney injury at this time we will continue to monitor  - monitor function and electrolytes as needed with replacement per ICU protocols. - generally avoid nephrotoxic agents such as NSAID, IV contrast unless specifically required  - adjust medications as needed for renal clearance  - follow I/O's as appropriate.    Infectious Disease:   1.  Questionable food poisoning  2.  Vibrio negative at this time  3.  CT shows mild colitis and possible nonspecific acute enteritis.  Plan  -Continue broad spectrum   antibiotics, follow-up on all culture results.  Appreciate ID input    Endocrine:   1.  History of diabetes type 2 present on admission  2. Stress induced hyperglycemia  3.  Plan  - ICU insulin protocol, goal sugar <180  -Hold metformin and Ozempic at this time    Hematology/Oncology:   1.  No leukocytosis  2.  No anemia, no signs, symptoms of active blood loss  3.  Plan  -Of interest patient  has no leukocytosis which would go against this being an infectious nature.     IV/Access:   1. Venous access -not indicated  2. Arterial access -not indicated  3.  Plan  -If patient persistently was needs norepinephrine for greater than 24 hours would consider PICC line      ICU Prophylaxis:   1. DVT: Low molecular weight heparin  2. VAP: Not indicated  3. Stress Ulcer: PPI/H2 blocker  4. Restraints: Not indicated  5. Wound care  -local  6. Feeding -NPO  7. Family Update: Discussed with patient and family member at bedside need for continued volume resuscitation.  We talked about the plan if she remained hypotensive despite adequate volume resuscitation we will start peripheral norepinephrine.    8. Disposition - icu        Key goals for next 24 hours:   1.  Continue volume resuscitation  2.  Follow-up ID recommendations  3.  Continue n.p.o.               Medical History:     No past medical history on file.  No past surgical history on file.  Social History     Socioeconomic History    Marital status:      Spouse name: Not on file    Number of children: Not on file    Years of education: Not on file    Highest education level: Not on file   Occupational History    Not on file   Tobacco Use    Smoking status: Not on file    Smokeless tobacco: Not on file   Substance and Sexual Activity    Alcohol use: Not on file    Drug use: Not on file    Sexual activity: Not on file   Other Topics Concern    Not on file   Social History Narrative    Not on file     Social Determinants of Health     Financial Resource Strain: Not on file   Food Insecurity: Not on file   Transportation Needs: Not on file   Physical Activity: Not on file   Stress: Not on file   Social Connections: Not on file   Interpersonal Safety: Not on file   Housing Stability: Not on file        Allergies   Allergen Reactions    Dulaglutide Rash     LOCAL REACTION TO INJECT SITE    Penicillins Rash     Tolerated Zosyn on 2/11/2024              Key  Medications:      insulin aspart  1-7 Units Subcutaneous TID AC    insulin aspart  1-5 Units Subcutaneous At Bedtime    magnesium sulfate  4 g Intravenous Once    pantoprazole  40 mg Oral Daily    piperacillin-tazobactam  3.375 g Intravenous Q6H    potassium chloride  10 mEq Intravenous Q1H    sodium chloride (PF)  3 mL Intracatheter Q8H    sodium chloride (PF)  3 mL Intracatheter Q8H      NaCl 0.45 % 1,000 mL with potassium chloride 20 mEq/L, sodium bicarbonate 50 mEq/L infusion 100 mL/hr at 02/12/24 1200    norepinephrine          Home Meds  No current facility-administered medications on file prior to encounter.  alendronate (FOSAMAX) 70 MG tablet, Take 70 mg by mouth every 7 days Saturday  FARXIGA 10 MG TABS tablet, Take 10 mg by mouth daily  gabapentin (NEURONTIN) 600 MG tablet, Take 600 mg by mouth 3 times daily  lisinopril (ZESTRIL) 20 MG tablet, Take 20 mg by mouth daily  metFORMIN (GLUCOPHAGE) 1000 MG tablet, Take 1 tablet by mouth 2 times daily  omeprazole (PRILOSEC) 20 MG DR capsule, Take 20 mg by mouth daily  Semaglutide, 1 MG/DOSE, (OZEMPIC) 4 MG/3ML pen, Inject 1 mg Subcutaneous every 7 days Sunday  simvastatin (ZOCOR) 40 MG tablet, Take 40 mg by mouth at bedtime  EPINEPHrine (ANY BX GENERIC EQUIV) 0.3 MG/0.3ML injection 2-pack, Inject 0.3 mLs (0.3 mg) into the muscle as needed for anaphylaxis May repeat one time in 5-15 minutes if response to initial dose is inadequate.               Physical Examination:   Temp:  [97.1  F (36.2  C)-100.1  F (37.8  C)] 97.1  F (36.2  C)  Pulse:  [] 90  Resp:  [10-40] 16  BP: ()/(42-95) 103/68  SpO2:  [90 %-99 %] 98 %    Intake/Output Summary (Last 24 hours) at 2/12/2024 1228  Last data filed at 2/12/2024 1200  Gross per 24 hour   Intake 3960 ml   Output 700 ml   Net 3260 ml     Wt Readings from Last 4 Encounters:   02/11/24 63.5 kg (140 lb)   07/11/22 66.2 kg (145 lb 15.1 oz)     BP - Mean:  [55-97] 78  Resp: 16    No lab results found in last 7  "days.    GEN: no acute distress, not pale, not jaundiced  HEENT: head ncat, sclera anicteric, OP patent, trachea midline   PULM: Clear spontaneous breath sounds  CV/COR: RRR S1S2 no gallop,  No rub, no murmur  ABD: soft mild diffuse tenderness, hyperactive bowel sounds, no mass  EXT:  Edema   warm  NEURO: grossly intact follows commands in all 4 extremities  SKIN: no obvious rash  LINES: clean, dry intact         Data:   All data and imaging reviewed     ROUTINE ICU LABS (Last four results)  CMP  Recent Labs   Lab 02/12/24  1209 02/12/24  0840 02/12/24  0803 02/12/24  0335 02/12/24  0149 02/11/24  2209 02/11/24  1755   NA  --  135  --   --  136  --  133*   POTASSIUM  --  3.8  --   --  3.5  --  4.2   CHLORIDE  --  106  --   --  108*  --  96*   CO2  --  19*  --   --  15*  --  15*   ANIONGAP  --  10  --   --  13  --  22*   * 138* 122* 147* 208*   < > 293*   BUN  --  17.5  --   --  24.0*  --  29.9*   CR  --  0.53  --   --  0.65  --  0.90   GFRESTIMATED  --  >90  --   --  >90  --  69   JIMMY  --  7.6*  --   --  7.8*  --  9.7   MAG  --  1.5*  --   --   --   --   --    PHOS  --  2.5  --   --   --   --   --    PROTTOTAL  --  5.0*  --   --   --   --  6.9   ALBUMIN  --  3.0*  --   --   --   --  4.3   BILITOTAL  --  0.8  --   --   --   --  1.4*   ALKPHOS  --  27*  --   --   --   --  43   AST  --  40  --   --   --   --  31   ALT  --  39  --   --   --   --  38    < > = values in this interval not displayed.     CBC  Recent Labs   Lab 02/12/24  0840 02/12/24  0149 02/11/24  1755   WBC 6.1  6.1  --  5.8   RBC 4.23  4.23  --  5.52*   HGB 13.0  13.0 13.5 16.9*   HCT 39.6  39.6  --  52.3*   MCV 94  94  --  95   MCH 30.7  30.7  --  30.6   MCHC 32.8  32.8  --  32.3   RDW 13.5  13.5  --  13.3   *  130*  --  192     INRNo lab results found in last 7 days.  Arterial Blood GasNo lab results found in last 7 days.    All cultures:  No results for input(s): \"CULT\" in the last 168 hours.  Recent Results (from the past 24 " hour(s))   CT Abdomen Pelvis w Contrast    Narrative    EXAM: CT ABDOMEN PELVIS W CONTRAST  LOCATION: [facility]  DATE: 2/11/2024    INDICATION: Abdominal pain, nausea, vomiting, diarrhea and fevers.  COMPARISON: None.  TECHNIQUE: CT scan of the abdomen and pelvis was performed following injection of IV contrast. Multiplanar reformats were obtained. Dose reduction techniques were used.  CONTRAST: 71mL Isovue 370    FINDINGS:   LOWER CHEST: Visualized lungs are clear. No pleural effusion. Heart size normal with no pericardial effusion.  Small esophageal hiatal hernia.    HEPATOBILIARY: Moderate diffuse hepatic steatosis. Liver is otherwise normal. Mild bile duct dilatation with no radiodense stone or mass consistent with reservoir effect from prior cholecystectomy.    PANCREAS: Normal.    SPLEEN: Spleen size normal.    ADRENAL GLANDS: Normal.    KIDNEYS/BLADDER: Kidneys, ureters and bladder are normal.    BOWEL: Mucosal hyperenhancement and severe circumferential submucosal edema and perienteric edema involving the mid and distal jejunum and the entire ileum as well as mucosal hyperenhancement and milder submucosal edema and pericolic edema affecting the   cecum and ascending colon. Mild mural thickening and liquid stool throughout the remainder of the colon. Findings compatible with a severe nonspecific acute enteritis and milder acute colitis.    Trace amount of free fluid in the abdomen and pelvis. No obstruction, pneumatosis or free air.    LYMPH NODES: No lymphadenopathy.    VASCULATURE: Normal caliber abdominal aorta.   Patent nonstenotic mesenteric and renal arteries. IVC and hepatic, portal, mesenteric, splenic and renal veins patent.    PELVIC ORGANS: No pelvic mass.    MUSCULOSKELETAL: Unremarkable.      Impression    IMPRESSION:   1.  Severe nonspecific acute enteritis and milder acute colitis. Vasculature appears to be normal for this is most likely infectious with inflammatory  bowel disease flare not excluded.  2.  Moderate diffuse hepatic steatosis.   3.  Mild bile duct dilatation with no radiodense stone or mass consistent with reservoir effect from prior cholecystectomy.         Billing: This patient is critically ill: Yes. Total critical care time today 31 min.

## 2024-02-12 NOTE — ED NOTES
PT GOT BACK FROM CT AND HAD BEEN INC. OF STOOL AT CT, PT ASSISTED UP TO COMMODE, PT TREMBLING, LEG LOOK BLOTCHY, AND PT NAUSEATED, DRY HEAVING AND HAVING LARGE WATERY DIARRHEA. PT BP REMAINED STABLE, HEART RATE INCREASED INTO 118. MD INFORMED AND TO BEDSIDE TO ASSESS PT.

## 2024-02-12 NOTE — CONSULTS
Care Management Discharge Note    Discharge Disposition: Home    Discharge Services: None    Discharge DME: None    Discharge Transportation: family or friend will provide    Private pay costs discussed: Not applicable    Does the patient's insurance plan have a 3 day qualifying hospital stay waiver?  No    PAS Confirmation Code: N/A  Patient/family educated on Medicare website which has current facility and service quality ratings:  (N/A)    Education Provided on the Discharge Plan: Yes  Persons Notified of Discharge Plans: Pt and pt's dtr  Patient/Family in Agreement with the Plan: yes    Handoff Referral Completed: No    Additional Information:  Sw met with the pt to discuss her discharge plan and identify any needs. Pt was resting in bed when Sw arrived.  Pt was in a pleasant mood and contributed appropriately to the conversation.  Pt's dtr Opal was at the bedside.    Pt lives with her son, dtr, and grandchild.  They live in a split level house.  Pt is independent at baseline and uses no assistive devices.  Pt does not drive, but her family provides transportation for her.  Pt has no concerns with returning home with her family.  No discharge needs identified at this time.  Pt is aware of how to contact Sw if discharge needs arise.    Sw will continue to be available as needed until discharge.      BASILIA Emmanuel, [facility]  Inpatient Care Coordination  [facility]  [phone]

## 2024-02-12 NOTE — ED NOTES
[facility]  ED Nurse Handoff Report    ED Chief complaint: Nausea, Vomiting, & Diarrhea  . ED Diagnosis:   Final diagnoses:   None     Allergies:   Allergies   Allergen Reactions    Dulaglutide Rash     LOCAL REACTION TO INJECT SITE    Penicillins Rash       Code Status: Full Code    Activity level - Baseline/Home:  independent.  Activity Level - Current:   standby.   Lift room needed: No.   Bariatric: No   Needed: No   Isolation: No.   Infection: Not Applicable.     Respiratory status: Room air    Vital Signs (within 30 minutes):   Vitals:    02/11/24 1921 02/11/24 1930 02/11/24 2010 02/11/24 2015   BP: (!) 87/52 (!) 84/52 93/54 93/54   Pulse: 105 106 103 102   Resp: 30  10 26   Temp:       TempSrc:       SpO2: 94%  92% 94%   Weight:         Cardiac Rhythm:  ,      Pain level:  ABD. PAIN  Patient confused: No.   Patient Falls Risk: patient and family education.   Elimination Status: Has voided      Focused Assessment:      Abnormal Results:   Labs Ordered and Resulted from Time of ED Arrival to Time of ED Departure   COMPREHENSIVE METABOLIC PANEL - Abnormal       Result Value    Sodium 133 (*)     Potassium 4.2      Carbon Dioxide (CO2) 15 (*)     Anion Gap 22 (*)     Urea Nitrogen 29.9 (*)     Creatinine 0.90      GFR Estimate 69      Calcium 9.7      Chloride 96 (*)     Glucose 293 (*)     Alkaline Phosphatase 43      AST 31      ALT 38      Protein Total 6.9      Albumin 4.3      Bilirubin Total 1.4 (*)    CBC WITH PLATELETS AND DIFFERENTIAL - Abnormal    WBC Count 5.8      RBC Count 5.52 (*)     Hemoglobin 16.9 (*)     Hematocrit 52.3 (*)     MCV 95      MCH 30.6      MCHC 32.3      RDW 13.3      Platelet Count 192      % Neutrophils 76      % Lymphocytes 12      % Monocytes 12      % Eosinophils 0      % Basophils 0      % Immature Granulocytes 0      NRBCs per 100 WBC 0      Absolute Neutrophils 4.4      Absolute Lymphocytes 0.7 (*)     Absolute Monocytes 0.7      Absolute  Eosinophils 0.0      Absolute Basophils 0.0      Absolute Immature Granulocytes 0.0      Absolute NRBCs 0.0     ISTAT GASES LACTATE VENOUS POCT - Abnormal    Lactic Acid POCT 5.6 (*)     Bicarbonate Venous POCT 18 (*)     O2 Sat, Venous POCT 51 (*)     pCO2 Venous POCT 30 (*)     pH Venous POCT 7.40      pO2 Venous POCT 26     KETONE BETA-HYDROXYBUTYRATE QUANTITATIVE, RAPID - Abnormal    Ketone (Beta-Hydroxybutyrate) Quantitative 0.60 (*)    ISTAT GASES LACTATE VENOUS POCT - Abnormal    Lactic Acid POCT 4.0 (*)     Bicarbonate Venous POCT 17 (*)     O2 Sat, Venous POCT 40 (*)     pCO2 Venous POCT 35 (*)     pH Venous POCT 7.30 (*)     pO2 Venous POCT 25     TROPONIN T, HIGH SENSITIVITY - Normal    Troponin T, High Sensitivity 8     INFLUENZA A/B, RSV, & SARS-COV2 PCR - Normal    Influenza A PCR Negative      Influenza B PCR Negative      RSV PCR Negative      SARS CoV2 PCR Negative     ROUTINE UA WITH MICROSCOPIC REFLEX TO CULTURE   ISTAT GASES LACTATE VENOUS POCT   LACTIC ACID WHOLE BLOOD   LIPASE   BLOOD CULTURE   BLOOD CULTURE        CT Abdomen Pelvis w Contrast    (Results Pending)       Treatments provided: PIV, LABS, FLUIDS, ABX, LEVOPEROPHERL, COVID/REV/FLU, VITALS, ZOFRAN, ISTAT, BLOODCULTUREX2, LACTATEISTAT..., URINESTILLNEEDED  Family Comments: AT BEDSIDE  OBS brochure/video discussed/provided to patient:  No  ED Medications:   Medications   vancomycin (VANCOCIN) 1,500 mg in 0.9% NaCl 250 mL intermittent infusion (1,500 mg Intravenous $New Bag 2/11/24 2054)   sodium chloride 0.9% BOLUS 1,000 mL (1,000 mLs Intravenous $New Bag 2/11/24 2050)   norepinephrine (LEVOPHED) 4 mg in  mL PERIPHERAL infusion (has no administration in time range)   CT scan flush (has no administration in time range)   iopamidol (ISOVUE-370) solution 500 mL (has no administration in time range)   sodium chloride 0.9% BOLUS 1,000 mL (0 mLs Intravenous Stopped 2/11/24 2051)   ondansetron (ZOFRAN) injection 4 mg (4 mg Intravenous  $Given 2/11/24 1829)   piperacillin-tazobactam (ZOSYN) 3.375 g vial to attach to  mL bag (0 g Intravenous Stopped 2/11/24 2050)   dicyclomine (BENTYL) tablet 20 mg (20 mg Oral $Given 2/11/24 2051)   ondansetron (ZOFRAN) injection 4 mg (4 mg Intravenous $Given 2/11/24 2050)   famotidine (PEPCID) injection 20 mg (20 mg Intravenous $Given 2/11/24 2049)       Drips infusing:  Yes  For the majority of the shift this patient was Green.   Interventions performed were NONE.    Sepsis treatment initiated: Yes    Per the ED Provider, Time Zero for severe sepsis or septic shock is:      3 Hour Severe Sepsis Bundle Completion:  1. Initial Lactic Acid Result:   Recent Labs   Lab Test 02/11/24 2043 02/11/24 1825   LACT 4.0* 5.6*     2. Blood Cultures before Antibiotics: Yes  3. Broad Spectrum Antibiotics Administered:     Anti-infectives (From now, onward)      Start     Dose/Rate Route Frequency Ordered Stop    02/11/24 2005  vancomycin (VANCOCIN) 1,500 mg in 0.9% NaCl 250 mL intermittent infusion         1,500 mg  over 90 Minutes Intravenous ONCE 02/11/24 2002            4. 3350 ml of IV fluids have been given so far    6 Hour Severe Sepsis Bundle Completion:    1. Repeat Lactic Acid Level: Last result   Lab Results   Component Value Date    LACT 4.0 () 02/11/2024     2. Patient currently on Vasopressors =  No    Cares/treatment/interventions/medications to be completed following ED care: PER FLOOR PROTOCOL, VITALS, LACTATE,   URINESTILLNEEDED, ABX, PAIN CONTROL, ORIENTATION STATUS, I&O,    ED Nurse Name: Jimmy Conde RN  9:17 PM  RECEIVING UNIT ED HANDOFF REVIEW    RECEIVING UNIT ED HANDOFF REVIEW    Above ED Nurse Handoff Report was reviewed: Yes  Reviewed by: Janay Chua RN on February 11, 2024 at 10:18 PM

## 2024-02-12 NOTE — PLAN OF CARE
Cared for [id number]    [facility] [place]    Pt A/O x 4, VSS (ex BPs 81/49 - MD notified - assessed pt and ordered additional 500 mL bolus); Pt denies headache, dizziness & SOB. Pt reports abd pain (3/10), administered PO Tylenol; reports intermittent nausea, administered IV Compazine. Pt up Asst: 1 w/gait belt & walker. Lung sounds clear, RA. Tele: SR/ST (HR 90s-100s). Potassium 3.8, Mag 1.5 - replacing both per protocol. Blood glucose levels: 122. Edema in Face (+1/+2) and BL hands +2. Social Work following. Will continue with plan of care.    Watery, green/black stools - C.Diff lab pending

## 2024-02-12 NOTE — CONSULTS
Care Management Follow Up    Length of Stay (days): 1    Expected Discharge Date: 02/13/2024     Concerns to be Addressed: all concerns addressed in this encounter       Additional Information:  Patient was assessed this morning at 18%. Patient now has a high URR. Please see previous  note for consult information.     BASILIA Lazcano, LGSW  Emergency Room   Please contact the SW on the floor in which the patient is staying for any questions or concerns

## 2024-02-12 NOTE — PHARMACY-ADMISSION MEDICATION HISTORY
Pharmacist Admission Medication History    Admission medication history is complete. The information provided in this note is only as accurate as the sources available at the time of the update.    Information Source(s): Patient via in-person    Pertinent Information: -    Changes made to PTA medication list:  Added: all meds except Epi pen  Deleted: diphenhydramine  Changed: None    Allergies reviewed with patient and updates made in EHR: yes    Medication History Completed By: Sofia Clark [facility] 2/11/2024 10:29 PM    Prior to Admission medications    Medication Sig Last Dose Taking? Auth Provider Long Term End Date   alendronate (FOSAMAX) 70 MG tablet Take 70 mg by mouth every 7 days Saturday 2/10/2024 Yes Unknown, Entered By History Yes    FARXIGA 10 MG TABS tablet Take 10 mg by mouth daily 2/11/2024 Yes Unknown, Entered By History     gabapentin (NEURONTIN) 600 MG tablet Take 600 mg by mouth 3 times daily 2/11/2024 at am Yes Unknown, Entered By History Yes    lisinopril (ZESTRIL) 20 MG tablet Take 20 mg by mouth daily 2/11/2024 Yes Unknown, Entered By History Yes    metFORMIN (GLUCOPHAGE) 1000 MG tablet Take 1 tablet by mouth 2 times daily 2/11/2024 at am Yes Unknown, Entered By History Yes    omeprazole (PRILOSEC) 20 MG DR capsule Take 20 mg by mouth daily 2/11/2024 Yes Unknown, Entered By History     Semaglutide, 1 MG/DOSE, (OZEMPIC) 4 MG/3ML pen Inject 1 mg Subcutaneous every 7 days Sunday 2/4/2024 Yes Unknown, Entered By History  6/28/24   simvastatin (ZOCOR) 40 MG tablet Take 40 mg by mouth at bedtime 2/10/2024 Yes Unknown, Entered By History Yes    EPINEPHrine (ANY BX GENERIC EQUIV) 0.3 MG/0.3ML injection 2-pack Inject 0.3 mLs (0.3 mg) into the muscle as needed for anaphylaxis May repeat one time in 5-15 minutes if response to initial dose is inadequate.   Vanessa Cunningham MD

## 2024-02-12 NOTE — PLAN OF CARE
Assumed care of patient around 0330. Pt made IMC status for closer monitoring. VSS ex hypotensive with improvement after fluid bolus. A&O. Having some abdominal discomfort and cramping. LS clear. Mild facial and hand edema from receiving several fluid boluses. Continues to have loose stools. Enteric panel pending. IVF's infusing. Clear liquid diet. Zofran given x1 for nausea. Tele SR. IV Zosyn. Up with assist x1/walker and gait belt. Daughter at bedside.

## 2024-02-13 LAB
ANION GAP SERPL CALCULATED.3IONS-SCNC: 11 MMOL/L (ref 7–15)
BUN SERPL-MCNC: 7.2 MG/DL (ref 8–23)
CALCIUM SERPL-MCNC: 7.2 MG/DL (ref 8.8–10.2)
CHLORIDE SERPL-SCNC: 110 MMOL/L (ref 98–107)
CREAT SERPL-MCNC: 0.42 MG/DL (ref 0.51–0.95)
DEPRECATED HCO3 PLAS-SCNC: 18 MMOL/L (ref 22–29)
EGFRCR SERPLBLD CKD-EPI 2021: >90 ML/MIN/1.73M2
ERYTHROCYTE [DISTWIDTH] IN BLOOD BY AUTOMATED COUNT: 13.4 % (ref 10–15)
GLUCOSE BLDC GLUCOMTR-MCNC: 101 MG/DL (ref 70–99)
GLUCOSE BLDC GLUCOMTR-MCNC: 113 MG/DL (ref 70–99)
GLUCOSE BLDC GLUCOMTR-MCNC: 118 MG/DL (ref 70–99)
GLUCOSE BLDC GLUCOMTR-MCNC: 138 MG/DL (ref 70–99)
GLUCOSE BLDC GLUCOMTR-MCNC: 77 MG/DL (ref 70–99)
GLUCOSE BLDC GLUCOMTR-MCNC: 85 MG/DL (ref 70–99)
GLUCOSE SERPL-MCNC: 97 MG/DL (ref 70–99)
HCT VFR BLD AUTO: 36.9 % (ref 35–47)
HGB BLD-MCNC: 12.4 G/DL (ref 11.7–15.7)
MAGNESIUM SERPL-MCNC: 2 MG/DL (ref 1.7–2.3)
MCH RBC QN AUTO: 31.2 PG (ref 26.5–33)
MCHC RBC AUTO-ENTMCNC: 33.6 G/DL (ref 31.5–36.5)
MCV RBC AUTO: 93 FL (ref 78–100)
PLATELET # BLD AUTO: 133 10E3/UL (ref 150–450)
POTASSIUM SERPL-SCNC: 3.7 MMOL/L (ref 3.4–5.3)
RBC # BLD AUTO: 3.98 10E6/UL (ref 3.8–5.2)
SODIUM SERPL-SCNC: 139 MMOL/L (ref 135–145)
WBC # BLD AUTO: 6.7 10E3/UL (ref 4–11)

## 2024-02-13 PROCEDURE — 258N000003 HC RX IP 258 OP 636: Performed by: INTERNAL MEDICINE

## 2024-02-13 PROCEDURE — 250N000009 HC RX 250: Performed by: STUDENT IN AN ORGANIZED HEALTH CARE EDUCATION/TRAINING PROGRAM

## 2024-02-13 PROCEDURE — 250N000011 HC RX IP 250 OP 636: Performed by: INTERNAL MEDICINE

## 2024-02-13 PROCEDURE — 99232 SBSQ HOSP IP/OBS MODERATE 35: CPT | Performed by: INTERNAL MEDICINE

## 2024-02-13 PROCEDURE — 258N000002 HC RX IP 258 OP 250: Performed by: STUDENT IN AN ORGANIZED HEALTH CARE EDUCATION/TRAINING PROGRAM

## 2024-02-13 PROCEDURE — 99231 SBSQ HOSP IP/OBS SF/LOW 25: CPT | Performed by: ANESTHESIOLOGY

## 2024-02-13 PROCEDURE — 36415 COLL VENOUS BLD VENIPUNCTURE: CPT | Performed by: STUDENT IN AN ORGANIZED HEALTH CARE EDUCATION/TRAINING PROGRAM

## 2024-02-13 PROCEDURE — 83735 ASSAY OF MAGNESIUM: CPT | Performed by: STUDENT IN AN ORGANIZED HEALTH CARE EDUCATION/TRAINING PROGRAM

## 2024-02-13 PROCEDURE — 250N000011 HC RX IP 250 OP 636: Performed by: STUDENT IN AN ORGANIZED HEALTH CARE EDUCATION/TRAINING PROGRAM

## 2024-02-13 PROCEDURE — 80048 BASIC METABOLIC PNL TOTAL CA: CPT | Performed by: STUDENT IN AN ORGANIZED HEALTH CARE EDUCATION/TRAINING PROGRAM

## 2024-02-13 PROCEDURE — 85027 COMPLETE CBC AUTOMATED: CPT | Performed by: STUDENT IN AN ORGANIZED HEALTH CARE EDUCATION/TRAINING PROGRAM

## 2024-02-13 PROCEDURE — 250N000013 HC RX MED GY IP 250 OP 250 PS 637: Performed by: STUDENT IN AN ORGANIZED HEALTH CARE EDUCATION/TRAINING PROGRAM

## 2024-02-13 PROCEDURE — 120N000001 HC R&B MED SURG/OB

## 2024-02-13 RX ORDER — MAGNESIUM SULFATE HEPTAHYDRATE 40 MG/ML
2 INJECTION, SOLUTION INTRAVENOUS ONCE
Status: COMPLETED | OUTPATIENT
Start: 2024-02-13 | End: 2024-02-13

## 2024-02-13 RX ORDER — POTASSIUM CHLORIDE 7.45 MG/ML
10 INJECTION INTRAVENOUS
Status: COMPLETED | OUTPATIENT
Start: 2024-02-13 | End: 2024-02-13

## 2024-02-13 RX ADMIN — POTASSIUM CHLORIDE 10 MEQ: 7.46 INJECTION, SOLUTION INTRAVENOUS at 15:25

## 2024-02-13 RX ADMIN — POTASSIUM CHLORIDE: 2 INJECTION, SOLUTION, CONCENTRATE INTRAVENOUS at 22:08

## 2024-02-13 RX ADMIN — AZITHROMYCIN MONOHYDRATE 500 MG: 500 INJECTION, POWDER, LYOPHILIZED, FOR SOLUTION INTRAVENOUS at 16:09

## 2024-02-13 RX ADMIN — PIPERACILLIN AND TAZOBACTAM 3.38 G: 3; .375 INJECTION, POWDER, FOR SOLUTION INTRAVENOUS at 08:24

## 2024-02-13 RX ADMIN — POTASSIUM CHLORIDE 10 MEQ: 7.46 INJECTION, SOLUTION INTRAVENOUS at 14:17

## 2024-02-13 RX ADMIN — PIPERACILLIN AND TAZOBACTAM 3.38 G: 3; .375 INJECTION, POWDER, FOR SOLUTION INTRAVENOUS at 20:01

## 2024-02-13 RX ADMIN — PIPERACILLIN AND TAZOBACTAM 3.38 G: 3; .375 INJECTION, POWDER, FOR SOLUTION INTRAVENOUS at 02:58

## 2024-02-13 RX ADMIN — PANTOPRAZOLE SODIUM 40 MG: 40 TABLET, DELAYED RELEASE ORAL at 08:26

## 2024-02-13 RX ADMIN — MAGNESIUM SULFATE HEPTAHYDRATE 2 G: 2 INJECTION, SOLUTION INTRAVENOUS at 15:03

## 2024-02-13 RX ADMIN — POTASSIUM CHLORIDE: 2 INJECTION, SOLUTION, CONCENTRATE INTRAVENOUS at 09:31

## 2024-02-13 RX ADMIN — ONDANSETRON 4 MG: 2 INJECTION INTRAMUSCULAR; INTRAVENOUS at 08:57

## 2024-02-13 RX ADMIN — PIPERACILLIN AND TAZOBACTAM 3.38 G: 3; .375 INJECTION, POWDER, FOR SOLUTION INTRAVENOUS at 13:56

## 2024-02-13 ASSESSMENT — ACTIVITIES OF DAILY LIVING (ADL)
ADLS_ACUITY_SCORE: 33
ADLS_ACUITY_SCORE: 30
ADLS_ACUITY_SCORE: 33
ADLS_ACUITY_SCORE: 33
ADLS_ACUITY_SCORE: 32
ADLS_ACUITY_SCORE: 30
ADLS_ACUITY_SCORE: 32
ADLS_ACUITY_SCORE: 30
ADLS_ACUITY_SCORE: 32
ADLS_ACUITY_SCORE: 33

## 2024-02-13 NOTE — PLAN OF CARE
ICU End of Shift Summary. For vital signs, labs, and complete assessment please see Documentation Flowsheet    Pertinent Assessment:  A/O x4  VSS, afebrile  Crackles in bases of lungs, on RA, no MARQUEZ  Tele: SR  1 BM this shift, tolerating clear liquid diet  Voiding well in bedside commode  Edema in BUE  Major Shift Events:  Transferred to ICU at ~12  VAT placed US guided PIV   ID and SW/CC following  Discharge/Transfer needs: TBD    Bedside shift Report Completed: Y  Bedside Safety Check Completed: Y

## 2024-02-13 NOTE — PROGRESS NOTES
Patient hemodynamically stable overnight, no pressor requirements.  Reports abd pain better.  Temp: 98  F (36.7  C) Temp src: Oral BP: 118/76 Pulse: 87   Resp: 27 SpO2: 97 % O2 Device: None (Room air)     Awake Alert, following commands  Chest CTA  CV RRR  Abd soft with mile diffuse tenderness       Lab Results   Component Value Date     02/13/2024    Lab Results   Component Value Date    CHLORIDE 110 02/13/2024    CHLORIDE 106 07/11/2022    Lab Results   Component Value Date    BUN 7.2 02/13/2024    BUN 13 07/11/2022      Lab Results   Component Value Date    POTASSIUM 3.7 02/13/2024    POTASSIUM 5.0 07/11/2022    Lab Results   Component Value Date    CO2 18 02/13/2024    CO2 23 07/11/2022    Lab Results   Component Value Date    CR 0.42 02/13/2024         A/P 67 yo female with diabetes and HTN, present with hypovolemic hypotension secondary to large volume diarrhea.  Treated with fluids, no pressors required.  Progressing as expected, no critical care issues at this time, will sign off.    Sidney Gonzalez MD  Critical Care Medicine

## 2024-02-13 NOTE — PLAN OF CARE
Goal Outcome Evaluation:      Plan of Care Reviewed With: patient              ICU End of Shift Summary.  For vital signs and complete assessments, please see documentation flowsheets.      Pertinent assessments:   Neuro:A+O X4 able to make needs known  Cardiac:SR, BP stable  Resp:Regular non labored,good O2 Sats on RA  GI:Loose stools  decreasing in amount and frequency                                      :Voids large amounts urine  Skin:No issues  Lines:PIVs  Drips:NS with Kcl and Bicarb at 100cc/hr    Major Shift Events: Uneventful    Plan (Upcoming Events): Tx to floor  Discharge/Transfer Needs: TBD     Bedside Shift Report Completed : yes  Bedside Safety Check Completed: yes

## 2024-02-13 NOTE — CONSULTS
"SPIRITUAL HEALTH SERVICES - Consult Note  RH ICU  Referral Source/Reason for Visit: [facility] consult.    Summary and Recommendations -  Pt Charlette relies on her family and her Uats[employer] hortensia to cope with difficulties.  She is affiliated with [facility] but does not attend services regularly there since the pandemic.    Plan: Informed pt how she can request further  support.  This author and other chaplains remain available per pt/family request.     Leonardo Valente M.Div., [facility]  Staff     SHS available 24/7 for emergent requests/referrals, either by paging the on-call  or by entering an ASAP/STAT consult in [facility], which will also page the on-call .    Assessment    Saw pt Charlette P Ana Maria per [facility] consult to assess spiritual and emotional needs because patient responded \"Yes\" to the question in the admission assessment, \"Do you have any spiritual or Hoahaoism beliefs that will affect your care?\" Her daughter Opal was present.    Patient/Family Understanding of Illness and Goals of Care - Charlette reported that she is being treated for low blood pressure that was likely the result of food poisoning.      Distress and Loss - She denied any immediate concerns.    Strengths, Coping, and Resources -   Charlette lives with her daughter, son, and young grandchild.  Charlette is retired and enjoys walking her dog and spending time with her grandchild.    Meaning, Beliefs, and Spirituality - She is Uats[employer] and affiliated with [facility] but has not attended services regularly there since the pandemic.  She welcomed prayer.   "

## 2024-02-13 NOTE — PROGRESS NOTES
"[facility]  Infectious Disease Progress Note          Assessment and Plan:   Date of Admission:  2/11/2024  Date of Consult (When I saw the patient): 02/12/24        Assessment & Plan  Charlette Rodgers is a 68 year old who was admitted on 2/11/2024.      Impression: 1 68-year-old healthy female, underlying diabetes but no history of GI illness, acute gastroenteritis with some colitis component, hypotension, all occurring hours after eating raw crab that it marinated in the refrigerator for many days, if that is the source the concern would be vibrio cholera, enteric panel is negative and should have high sensitivity for that organism  2 diabetes mellitus     REC1 Zosyn and Zithromax for possible vibrio cholera, for the most part likely supportive care, fluids, etc. are more important than antibiotics here  2 once GI symptoms are better and eating and drinking okay probably okay disposition without much more in the way of antimicrobial therapy           Interval History:     no new complaints diarrhea and abdominal symptoms are all improved, no positive microbiology              Medications:      azithromycin  500 mg Intravenous Q24H    insulin aspart  1-7 Units Subcutaneous TID AC    insulin aspart  1-5 Units Subcutaneous At Bedtime    magnesium sulfate  2 g Intravenous Once    pantoprazole  40 mg Oral Daily    piperacillin-tazobactam  3.375 g Intravenous Q6H    potassium chloride  10 mEq Intravenous Q1H    sodium chloride (PF)  3 mL Intracatheter Q8H    sodium chloride (PF)  3 mL Intracatheter Q8H                  Physical Exam:   Blood pressure 126/75, pulse 83, temperature 97.8  F (36.6  C), resp. rate 21, height 1.575 m (5' 2\"), weight 66.5 kg (146 lb 9.7 oz), SpO2 97%.  Wt Readings from Last 2 Encounters:   02/13/24 66.5 kg (146 lb 9.7 oz)   07/11/22 66.2 kg (145 lb 15.1 oz)     Vital Signs with Ranges  Temp:  [97.1  F (36.2  C)-98.4  F (36.9  C)] 97.8  F (36.6  C)  Pulse:  [83-92] " "83  Resp:  [12-27] 21  BP: ()/(62-78) 126/75  SpO2:  [96 %-98 %] 97 %    Constitutional: Awake, alert, cooperative, no apparent distress looks better, temp down and mentation okay     Lungs: Clear to auscultation bilaterally, no crackles or wheezing   Cardiovascular: Regular rate and rhythm, normal S1 and S2, and no murmur noted   Abdomen: Normal bowel sounds, soft, non-distended, non-tender   Skin: No rashes, no cyanosis, no edema   Other:           Data:   All microbiology laboratory data reviewed.  Recent Labs   Lab Test 02/13/24 0522 02/12/24  0840 02/12/24  0149 02/11/24  1755   WBC 6.7 6.1  6.1  --  5.8   HGB 12.4 13.0  13.0 13.5 16.9*   HCT 36.9 39.6  39.6  --  52.3*   MCV 93 94  94  --  95   * 130*  130*  --  192     Recent Labs   Lab Test 02/13/24 0522 02/12/24  0840 02/12/24  0149   CR 0.42* 0.53 0.65     No lab results found.  No lab results found.    Invalid input(s): \"UC\"     "

## 2024-02-13 NOTE — PROGRESS NOTES
[facility]    Medicine Progress Note - Hospitalist Service    Date of Admission:  2/11/2024    Assessment & Plan   Charlette Rodgers is a 68 year old female with past medical history of essential hypertension, hyperlipidemia and diabetes. She ate some raw crab day prior to arrival and has been having loose watery stools since then.  She states that she had more than 20 stools on the day of admission.  She has nausea with minimal emesis.  She was complaining of diffuse abdominal pains.  Denies fever or chills.  Initial vitals showed temperature 98.2  F, heart rate 136/min, blood pressure of 96/66 which dropped down to 78/58, respiratory rate of 22 and oxygen saturation of 99% on room air.     Initial lactate was 5.6.  CMP showed BUN/creatinine of 29.9/0.9 with glucose of 293.  Ketones of 0.6.  WBC 5.8 and hemoglobin 16.9.  VBG with pH/pCO2 of 7.4/30. CT abdomen showed nonspecific acute enteritis with mild acute colitis.  Patient was given NS x 3 L, dose of vancomycin and Zosyn in the ER.  Patient was transferred to ICU because of concerns with low blood pressure after receiving fluids.    On arrival to ICU, her blood pressure was stable.  Did not require pressors.    Severe acute enteritis, mild colitis  Undifferentiated shock with lactic acidosis:  Ate raw crab and developed profuse diarrhea, nausea, vomiting within 12-24 hours.  20+ stools on the day of discharge.  Denies melena or hematochezia.  Initially, hypotension was thought related to hypovolemia. CT shows severe non-specific acute enteritis and milder acute colitis.  There is also mild bile duct dilation but suspect due to reservoir effect following cholecystectomy.   -Lipase, LFTs normal. WBC is also normal. Hgb stable. Fortunately her lactic acidosis has resolved. GI panel negative.    -She was started on Zosyn. Infectious disease was consulted and added azithromycin for possible vibrio cholera   -Received fluid boluses and then  transferred to ICU. Did not require pressors in ICU  -Symptoms improving. Now vital signs stable. CBC normal. Cultures negative to date. C.diff negative   -Continue current abx. Further abx recommendations per ID  -Transfer to medical floor     Mixed anion gap and non-anion gap metabolic acidosis:  -Anion gap acidosis due to lactic acidosis and mild ketosis and nongap anion gap acidosis due to GI losses and resuscitation with NS.   -Improved with IV fluids with bicarbonate  -Bicarb 18 this morning.  Will continue half-normal saline with KCl 20 mill equivalent per liter, bicarb 50 mEq/L at 500 ml/hr  -Will recheck BMP in am      Diabetes mellitus type 2:  Initial blood glucose of 293, likely stress-induced as on recheck it came down to 148 without any hypoglycemic agents. Prior to admission on metformin, Farxiga and Ozempic.    -Blood sugar 85 this morning  -Will hold his medication given his sliding scale insulin until she tolerates oral intake.  -Will advance diet as tolerated      Essential hypertension:  Prior to admission on lisinopril which will be held.     Hyperlipidemia: Resume simvastatin on discharge.     GERD: Continue PPI.     Hemoconcentration due to dehydration: Expect drop in hemoglobin with IV hydration.    Diet: Regular Diet Adult    DVT Prophylaxis: Pneumatic Compression Devices  Carlton Catheter: Not present  Lines: None     Cardiac Monitoring: ACTIVE order. Indication: ICU  Code Status: Full Code      Clinically Significant Risk Factors           # Hypercalcemia: corrected calcium is >10.1, will monitor as appropriate  # Hypomagnesemia: Lowest Mg = 1.5 mg/dL in last 2 days, will replace as needed  # Anion Gap Metabolic Acidosis: Highest Anion Gap = 22 mmol/L in last 2 days, will monitor and treat as appropriate  # Hypoalbuminemia: Lowest albumin = 3 g/dL at 2/12/2024  8:40 AM, will monitor as appropriate           # DMII: A1C = 7.1 % (Ref range: <5.7 %) within past 6 months, PRESENT ON ADMISSION  #  "Overweight: Estimated body mass index is 26.81 kg/m  as calculated from the following:    Height as of this encounter: 1.575 m (5' 2\").    Weight as of this encounter: 66.5 kg (146 lb 9.7 oz)., PRESENT ON ADMISSION       # Financial/Environmental Concerns: none         Disposition Plan  Transfer to medical floor      Expected Discharge Date: 02/14/2024      Destination: home with family            Rocío Arsenio Shabazz MD, MD  Hospitalist Service  [facility]  Securely message with [employer] (more info)  Text page via [facility] Paging/Directory   ______________________________________________________________________    Interval History   Patient seen and examined today.  Chart reviewed.  Patient states that she is feeling much better.  Abdominal pain improving.  No nausea or vomiting today.  Also denies diarrhea.  No fever.    Physical Exam   Vital Signs: Temp: 98  F (36.7  C) Temp src: Oral BP: 118/76 Pulse: 87   Resp: 27 SpO2: 97 % O2 Device: None (Room air)    Weight: 146 lbs 9.69 oz    General Appearance: Awake, alert.  Fully oriented.  Moderately ill appearing,  face appears flushed.   Respiratory: CTAB.  Normal WOB on RA.  No wheezing.    Cardiovascular: RRR no mrg.  Trace edema to the BLE.   GI: Soft.  TTP in the epigastric region and left abdomen.  No rebound tenderness or guarding.   Skin: No rashes or lesions on exposed skin.  Mildly flushed and diaphoretic.  No hives, rash, etc.   Other: No stridor.  No tongue swelling.      Medical Decision Making       55 MINUTES SPENT BY ME on the date of service doing chart review, history, exam, documentation & further activities per the note.      Data   ------------------------- PAST 24 HR DATA REVIEWED -----------------------------------------------    I have personally reviewed the following data over the past 24 hrs:    6.7  \   12.4   / 133 (L)     139 110 (H) 7.2 (L) /  85   3.7 18 (L) 0.42 (L) \       Imaging results reviewed over the past 24 " hrs:   No results found for this or any previous visit (from the past 24 hour(s)).

## 2024-02-14 VITALS
RESPIRATION RATE: 20 BRPM | OXYGEN SATURATION: 98 % | DIASTOLIC BLOOD PRESSURE: 78 MMHG | TEMPERATURE: 98.4 F | WEIGHT: 144.9 LBS | HEART RATE: 84 BPM | HEIGHT: 62 IN | SYSTOLIC BLOOD PRESSURE: 131 MMHG | BODY MASS INDEX: 26.67 KG/M2

## 2024-02-14 LAB
ANION GAP SERPL CALCULATED.3IONS-SCNC: 10 MMOL/L (ref 7–15)
BUN SERPL-MCNC: 4.2 MG/DL (ref 8–23)
CALCIUM SERPL-MCNC: 7.7 MG/DL (ref 8.8–10.2)
CHLORIDE SERPL-SCNC: 107 MMOL/L (ref 98–107)
CREAT SERPL-MCNC: 0.43 MG/DL (ref 0.51–0.95)
DEPRECATED HCO3 PLAS-SCNC: 21 MMOL/L (ref 22–29)
EGFRCR SERPLBLD CKD-EPI 2021: >90 ML/MIN/1.73M2
ERYTHROCYTE [DISTWIDTH] IN BLOOD BY AUTOMATED COUNT: 13.2 % (ref 10–15)
GLUCOSE BLDC GLUCOMTR-MCNC: 119 MG/DL (ref 70–99)
GLUCOSE SERPL-MCNC: 129 MG/DL (ref 70–99)
HCT VFR BLD AUTO: 38.8 % (ref 35–47)
HGB BLD-MCNC: 12.8 G/DL (ref 11.7–15.7)
MAGNESIUM SERPL-MCNC: 2 MG/DL (ref 1.7–2.3)
MCH RBC QN AUTO: 30.3 PG (ref 26.5–33)
MCHC RBC AUTO-ENTMCNC: 33 G/DL (ref 31.5–36.5)
MCV RBC AUTO: 92 FL (ref 78–100)
PLATELET # BLD AUTO: 143 10E3/UL (ref 150–450)
POTASSIUM SERPL-SCNC: 3.8 MMOL/L (ref 3.4–5.3)
RBC # BLD AUTO: 4.23 10E6/UL (ref 3.8–5.2)
SODIUM SERPL-SCNC: 138 MMOL/L (ref 135–145)
WBC # BLD AUTO: 5.8 10E3/UL (ref 4–11)

## 2024-02-14 PROCEDURE — 85027 COMPLETE CBC AUTOMATED: CPT | Performed by: STUDENT IN AN ORGANIZED HEALTH CARE EDUCATION/TRAINING PROGRAM

## 2024-02-14 PROCEDURE — 80048 BASIC METABOLIC PNL TOTAL CA: CPT | Performed by: INTERNAL MEDICINE

## 2024-02-14 PROCEDURE — 250N000011 HC RX IP 250 OP 636: Performed by: STUDENT IN AN ORGANIZED HEALTH CARE EDUCATION/TRAINING PROGRAM

## 2024-02-14 PROCEDURE — 250N000013 HC RX MED GY IP 250 OP 250 PS 637: Performed by: HOSPITALIST

## 2024-02-14 PROCEDURE — 250N000013 HC RX MED GY IP 250 OP 250 PS 637: Performed by: STUDENT IN AN ORGANIZED HEALTH CARE EDUCATION/TRAINING PROGRAM

## 2024-02-14 PROCEDURE — 99238 HOSP IP/OBS DSCHRG MGMT 30/<: CPT | Performed by: INTERNAL MEDICINE

## 2024-02-14 PROCEDURE — 36415 COLL VENOUS BLD VENIPUNCTURE: CPT | Performed by: STUDENT IN AN ORGANIZED HEALTH CARE EDUCATION/TRAINING PROGRAM

## 2024-02-14 PROCEDURE — 83735 ASSAY OF MAGNESIUM: CPT | Performed by: INTERNAL MEDICINE

## 2024-02-14 RX ORDER — GABAPENTIN 300 MG/1
300 CAPSULE ORAL 3 TIMES DAILY
Status: DISCONTINUED | OUTPATIENT
Start: 2024-02-14 | End: 2024-02-14 | Stop reason: HOSPADM

## 2024-02-14 RX ORDER — GABAPENTIN 300 MG/1
300 CAPSULE ORAL ONCE
Status: COMPLETED | OUTPATIENT
Start: 2024-02-14 | End: 2024-02-14

## 2024-02-14 RX ADMIN — GABAPENTIN 300 MG: 300 CAPSULE ORAL at 01:46

## 2024-02-14 RX ADMIN — PANTOPRAZOLE SODIUM 40 MG: 40 TABLET, DELAYED RELEASE ORAL at 07:51

## 2024-02-14 RX ADMIN — GABAPENTIN 300 MG: 300 CAPSULE ORAL at 07:50

## 2024-02-14 RX ADMIN — PIPERACILLIN AND TAZOBACTAM 3.38 G: 3; .375 INJECTION, POWDER, FOR SOLUTION INTRAVENOUS at 01:46

## 2024-02-14 ASSESSMENT — ACTIVITIES OF DAILY LIVING (ADL)
ADLS_ACUITY_SCORE: 33

## 2024-02-14 NOTE — PLAN OF CARE
Goal Outcome Evaluation:      Plan of Care Reviewed With: patient, child    Overall Patient Progress: improvingOverall Patient Progress: improving    Outcome Evaluation: Discharging    Discharge Note    Patient discharged to home via private vehicle  accompanied by daughter.  IVx2: Discontinued  Prescriptions N/A.   Belongings reviewed and sent with patient.   Home medications returned to patient: NA  Equipment sent with: N/A.   patient and family verbalizes understanding of discharge instructions. AVS given to patient.  Additional education completed?  None needed

## 2024-02-14 NOTE — H&P
[facility]  Hospitalist Discharge Summary      Date of Admission:  2/11/2024  Date of Discharge:  2/14/2024  Discharging Provider: Munir Haines MD  Discharge Service: Hospitalist Service  Primary Care Physician   GAVIN MELGOZA    Discharge Diagnoses   Food poisoning, concern for vibrio cholera  Severe enteritis  Shock with lactic acidosis  Metabolic acidosis with anion gap  Type 2 diabetes, not on insulin dependent  Essential hypertension  Hyperlipidemia  GERD    Hospital Course     Charlette Rodgers is a 68 year old female with past medical history of essential hypertension, hyperlipidemia and diabetes. She ate some raw crab the day prior to arrival and had been having loose watery stools since then.  She stated that she had more than 20 stools on the day of admission.  She had nausea with minimal emesis.  She was complaining of diffuse abdominal pains as well.  Denies fever or chills.  Initial vitals showed temperature 98.2  F, heart rate 136/min, blood pressure of 96/66 which dropped down to 78/58, respiratory rate of 22 and oxygen saturation of 99% on room air.     Initial lactic acid was high at 5.6.  CMP showed BUN/creatinine of 29.9/0.9 with glucose of 293.  Ketones of 0.6.  WBC 5.8 and hemoglobin 16.9.  VBG with pH/pCO2 of 7.4/30. CT abdomen showed nonspecific acute enteritis with mild acute colitis.  Patient was given NS x 3 L, dose of vancomycin and Zosyn in the ER.  Patient was transferred to ICU because of concerns with low blood pressure after receiving fluids.  On arrival to ICU, her blood pressure was stable.  Did not require pressors.     Severe acute enteritis, mild colitis  Undifferentiated shock with lactic acidosis:  Ate raw crab the day prior to admission and developed profuse diarrhea, nausea, vomiting within 12-24 hours.  20+ stools on the day of admission.  Denied melena or hematochezia.  Initially she was hypotensive was thought related to hypovolemia. CT shows  "severe non-specific acute enteritis and milder acute colitis.  There is also mild bile duct dilation but suspect due to reservoir effect following cholecystectomy.  Lipase, LFTs normal. WBC is also normal. Hgb stable. Fortunately her lactic acidosis has resolved. GI panel negative. She was started on Zosyn. Infectious disease was consulted and added azithromycin for possible vibrio cholera given the story.  Her enteric pathogen panel though was negative. Received fluid boluses and then transferred to ICU. Did not require pressors in ICU.  Her symptoms quickly improved.  Her vital signs normalized and her CBC was normal. C.diff negative.  Per infectious disease patient does not need ongoing outpatient antibiotics.  She was able to advance her diet and felt much better on the day of discharge.  She will be discharging home.       Mixed anion gap and non-anion gap metabolic acidosis:  -Anion gap acidosis due to lactic acidosis and mild ketosis and nongap anion gap acidosis due to GI losses and resuscitation with NS.   -Improved with IV fluids with bicarbonate. Bicarb was 18 and improved with fluids.       Diabetes mellitus type 2:  Initial blood glucose of 293, likely stress-induced as on recheck it came down to 148 without any hypoglycemic agents. Prior to admission on metformin, Farxiga and Ozempic.  Will resume her home regimen at home.      Essential hypertension:  Prior to admission on lisinopril which will resume at discharge     Hyperlipidemia: Resume simvastatin on discharge.     GERD: Continue PPI.       Clinically Significant Risk Factors     # DMII: A1C = 7.1 % (Ref range: <5.7 %) within past 6 months  # Overweight: Estimated body mass index is 26.5 kg/m  as calculated from the following:    Height as of this encounter: 1.575 m (5' 2\").    Weight as of this encounter: 65.7 kg (144 lb 14.4 oz).       Significant Results and Procedures   Most Recent 3 CBC's:  Recent Labs   Lab Test 02/14/24  0640 " 02/13/24  0522 02/12/24  0840   WBC 5.8 6.7 6.1  6.1   HGB 12.8 12.4 13.0  13.0   MCV 92 93 94  94   * 133* 130*  130*     Most Recent 3 BMP's:  Recent Labs   Lab Test 02/14/24  0640 02/14/24  0131 02/13/24  2111 02/13/24  0813 02/13/24  0522 02/12/24  1209 02/12/24  0840     --   --   --  139  --  135   POTASSIUM 3.8  --   --   --  3.7  --  3.8   CHLORIDE 107  --   --   --  110*  --  106   CO2 21*  --   --   --  18*  --  19*   BUN 4.2*  --   --   --  7.2*  --  17.5   CR 0.43*  --   --   --  0.42*  --  0.53   ANIONGAP 10  --   --   --  11  --  10   JIMMY 7.7*  --   --   --  7.2*  --  7.6*   * 119* 138*   < > 97   < > 138*    < > = values in this interval not displayed.     Most Recent 2 LFT's:  Recent Labs   Lab Test 02/12/24  0840 02/11/24  1755   AST 40 31   ALT 39 38   ALKPHOS 27* 43   BILITOTAL 0.8 1.4*   ,   Results for orders placed or performed during the hospital encounter of 02/11/24   CT Abdomen Pelvis w Contrast    Narrative    EXAM: CT ABDOMEN PELVIS W CONTRAST  LOCATION: [facility]  DATE: 2/11/2024    INDICATION: Abdominal pain, nausea, vomiting, diarrhea and fevers.  COMPARISON: None.  TECHNIQUE: CT scan of the abdomen and pelvis was performed following injection of IV contrast. Multiplanar reformats were obtained. Dose reduction techniques were used.  CONTRAST: 71mL Isovue 370    FINDINGS:   LOWER CHEST: Visualized lungs are clear. No pleural effusion. Heart size normal with no pericardial effusion.  Small esophageal hiatal hernia.    HEPATOBILIARY: Moderate diffuse hepatic steatosis. Liver is otherwise normal. Mild bile duct dilatation with no radiodense stone or mass consistent with reservoir effect from prior cholecystectomy.    PANCREAS: Normal.    SPLEEN: Spleen size normal.    ADRENAL GLANDS: Normal.    KIDNEYS/BLADDER: Kidneys, ureters and bladder are normal.    BOWEL: Mucosal hyperenhancement and severe circumferential submucosal edema and  perienteric edema involving the mid and distal jejunum and the entire ileum as well as mucosal hyperenhancement and milder submucosal edema and pericolic edema affecting the   cecum and ascending colon. Mild mural thickening and liquid stool throughout the remainder of the colon. Findings compatible with a severe nonspecific acute enteritis and milder acute colitis.    Trace amount of free fluid in the abdomen and pelvis. No obstruction, pneumatosis or free air.    LYMPH NODES: No lymphadenopathy.    VASCULATURE: Normal caliber abdominal aorta.   Patent nonstenotic mesenteric and renal arteries. IVC and hepatic, portal, mesenteric, splenic and renal veins patent.    PELVIC ORGANS: No pelvic mass.    MUSCULOSKELETAL: Unremarkable.      Impression    IMPRESSION:   1.  Severe nonspecific acute enteritis and milder acute colitis. Vasculature appears to be normal for this is most likely infectious with inflammatory bowel disease flare not excluded.  2.  Moderate diffuse hepatic steatosis.   3.  Mild bile duct dilatation with no radiodense stone or mass consistent with reservoir effect from prior cholecystectomy.        Follow up/instructions: follow up with her primary care provider in a week.  Recheck her bmp     Pending test results at discharge:      Unresulted Labs Ordered in the Past 30 Days of this Admission       Date and Time Order Name Status Description    2/11/2024  6:34 PM Blood Culture Peripheral Blood Preliminary     2/11/2024  6:34 PM Blood Culture Arm, Right Preliminary              Discharge Orders      Reason for your hospital stay    Food poisoning     Follow-up and recommended labs and tests     Follow up with primary care provider, GAVIN MELGOZA, within 7 days for hospital follow- up.  No follow up labs or test are needed.     Activity    Your activity upon discharge: activity as tolerated     Diet    Follow this diet upon discharge: Orders Placed This Encounter      Regular Diet Adult        Discharge Disposition   Discharged to home  Condition at discharge: Stable      Consultations This Hospital Stay   PHARMACY TO DOSE VANCO  CARE MANAGEMENT / SOCIAL WORK IP CONSULT  INFECTIOUS DISEASES IP CONSULT  CARE MANAGEMENT / SOCIAL WORK IP CONSULT  SPIRITUAL HEALTH SERVICES IP CONSULT    Code Status   Full Code    Time Spent on this Encounter   IMunir MD, personally saw the patient today and spent less than or equal to 30 minutes discharging this patient. Patient new to me.  Spoke with nursing          This document was created using voice recognition technology.  Please excuse any typographical errors that may have occurred.  Please call with any questions.       Munir Haines MD  [place] [address] [facility]  [address] [place] [address]  Phone: [phone]  Fax: [phone]  ______________________________________________________________________    Physical Exam   Vital Signs: Temp: 98.4  F (36.9  C) Temp src: Oral BP: 131/78 Pulse: 84   Resp: 20 SpO2: 98 % O2 Device: None (Room air)    Weight: 144 lbs 14.4 oz    General appearance: Patient is alert and oriented x3, no apparent distress, pleasant and conversing normally, speaking in full sentences, appears stated age, lying in bed  HEENT:  Mucous membranes are moist  NECK:  supple without bruit or lymphadenopathy  RESPIRATORY: Clear to auscultation bilateral, good air movement  CARDIOVASCULAR: Regular rate and rhythm, normal S1/S2, no murmurs, rubs, or gallops present, peripheral pulses intact  GASTROINTESTINAL: Non-distended, non-tender, soft, bowel sounds present throughout  NEUROLOGIC:  Cranial nerves II-XII intact, without any focal deficits, strength 5/5 throughout  EXTREMITIES:  Moves all extremities, no clubbing, cyanosis, nor edema        Discharge Medications   Discharge Medication List as of 2/14/2024  7:58 AM        CONTINUE these medications which have NOT CHANGED    Details   alendronate  (FOSAMAX) 70 MG tablet Take 70 mg by mouth every 7 days Saturday, Historical      EPINEPHrine (ANY BX GENERIC EQUIV) 0.3 MG/0.3ML injection 2-pack Inject 0.3 mLs (0.3 mg) into the muscle as needed for anaphylaxis May repeat one time in 5-15 minutes if response to initial dose is inadequate., Disp-2 each, R-0, E-Prescribe      FARXIGA 10 MG TABS tablet Take 10 mg by mouth daily, CAM, Historical      gabapentin (NEURONTIN) 600 MG tablet Take 600 mg by mouth 3 times daily, Historical      lisinopril (ZESTRIL) 20 MG tablet Take 20 mg by mouth daily, Historical      metFORMIN (GLUCOPHAGE) 1000 MG tablet Take 1 tablet by mouth 2 times daily, Historical      omeprazole (PRILOSEC) 20 MG DR capsule Take 20 mg by mouth daily, Historical      Semaglutide, 1 MG/DOSE, (OZEMPIC) 4 MG/3ML pen Inject 1 mg Subcutaneous every 7 days Sunday, Historical      simvastatin (ZOCOR) 40 MG tablet Take 40 mg by mouth at bedtime, Historical           Allergies   Allergies   Allergen Reactions    Dulaglutide Rash     LOCAL REACTION TO INJECT SITE    Penicillins Rash     Tolerated Zosyn on 2/11/2024

## 2024-02-14 NOTE — PROGRESS NOTES
XC    Patient complaining of her baseline peripheral neuropathy.  She typically takes gabapentin 600 mg 3 times daily.  I will start at 300 mg 3 times daily for now and increase as able pending her tolerance.    Anthony Stephens, DO

## 2024-02-14 NOTE — PLAN OF CARE
Goal Outcome Evaluation:      Plan of Care Reviewed With: patient, family    Overall Patient Progress: improvingOverall Patient Progress: improving     Pertinent assessments: Pt A/O x4. Son at the bedside. VSS. Pain 3/10.Up Ax1 with IV pole  Tolerating PO. BS checks.    Major Shift Events: ICU transfer.    Treatment Plan: IVF, Zithromax, Zosyn, Monitor labs, Tele.  Pain control.

## 2024-02-14 NOTE — PLAN OF CARE
Goal Outcome Evaluation:      Plan of Care Reviewed With: patient    Overall Patient Progress: improvingOverall Patient Progress: improving     Aox4. Denies sob and nausea. Reports of minimal abdominal discomfort, rest promoted. IVF infusing. LS clear. BS active. Voiding good. Up with 1 person assist with  iv pole. Tele Sr 82.    Major shift events: none    Plan: IVF, Zithromax, Zosyn, Monitor labs, Tele.  Pain control.

## 2024-02-14 NOTE — PLAN OF CARE
Goal Outcome Evaluation:       To Do:  End of Shift Summary  For vital signs and complete assessments, please see documentation flowsheets.     Pertinent assessments: PT A/O. VSS. C/o mild abdo pain, declined analgesia. Denies nausea and SOB. No BM this shift. Voding adequately. . Tele- SR.  Up SBA  Major Shift Events : Gabapentin restarted for neuropathy.  Treatment Plan: Azithro IV, Zosyn and symptom management.  Bedside Nurse: Jenny Gabrer RN

## 2024-02-14 NOTE — DISCHARGE SUMMARY
[facility]  Hospitalist Discharge Summary      Date of Admission:  2/11/2024  Date of Discharge:  2/14/2024  Discharging Provider: Munir Haines MD  Discharge Service: Hospitalist Service  Primary Care Physician   GAVIN MELGOZA    Discharge Diagnoses   Food poisoning, concern for vibrio cholera  Severe enteritis  Shock with lactic acidosis  Metabolic acidosis with anion gap  Type 2 diabetes, not on insulin dependent  Essential hypertension  Hyperlipidemia  GERD    Hospital Course     Charlette Rodgers is a 68 year old female with past medical history of essential hypertension, hyperlipidemia and diabetes. She ate some raw crab the day prior to arrival and had been having loose watery stools since then.  She stated that she had more than 20 stools on the day of admission.  She had nausea with minimal emesis.  She was complaining of diffuse abdominal pains as well.  Denies fever or chills.  Initial vitals showed temperature 98.2  F, heart rate 136/min, blood pressure of 96/66 which dropped down to 78/58, respiratory rate of 22 and oxygen saturation of 99% on room air.     Initial lactic acid was high at 5.6.  CMP showed BUN/creatinine of 29.9/0.9 with glucose of 293.  Ketones of 0.6.  WBC 5.8 and hemoglobin 16.9.  VBG with pH/pCO2 of 7.4/30. CT abdomen showed nonspecific acute enteritis with mild acute colitis.  Patient was given NS x 3 L, dose of vancomycin and Zosyn in the ER.  Patient was transferred to ICU because of concerns with low blood pressure after receiving fluids.  On arrival to ICU, her blood pressure was stable.  Did not require pressors.     Severe acute enteritis, mild colitis  Undifferentiated shock with lactic acidosis  Shock, not due to infection rather due to volume loss from diarrhea:  Ate raw crab the day prior to admission and developed profuse diarrhea, nausea, vomiting within 12-24 hours.  20+ stools on the day of admission.  Denied melena or hematochezia.   "Initially she was hypotensive was thought related to hypovolemia. CT shows severe non-specific acute enteritis and milder acute colitis.  There is also mild bile duct dilation but suspect due to reservoir effect following cholecystectomy.  Lipase, LFTs normal. WBC is also normal. Hgb stable. Fortunately her lactic acidosis has resolved. GI panel negative. She was started on Zosyn. Infectious disease was consulted and added azithromycin for possible vibrio cholera given the story.  Her enteric pathogen panel though was negative. Received fluid boluses and then transferred to ICU. Did not require pressors in ICU.  Her symptoms quickly improved.  Her vital signs normalized and her CBC was normal. C.diff negative.  Per infectious disease patient does not need ongoing outpatient antibiotics.  She was able to advance her diet and felt much better on the day of discharge.  She will be discharging home.       Mixed anion gap and non-anion gap metabolic acidosis:  -Anion gap acidosis due to lactic acidosis and mild ketosis and nongap anion gap acidosis due to GI losses and resuscitation with NS.   -Improved with IV fluids with bicarbonate. Bicarb was 18 and improved with fluids.       Diabetes mellitus type 2:  Initial blood glucose of 293, likely stress-induced as on recheck it came down to 148 without any hypoglycemic agents. Prior to admission on metformin, Farxiga and Ozempic.  Will resume her home regimen at home.      Essential hypertension:  Prior to admission on lisinopril which will resume at discharge     Hyperlipidemia: Resume simvastatin on discharge.     GERD: Continue PPI.       Clinically Significant Risk Factors     # DMII: A1C = 7.1 % (Ref range: <5.7 %) within past 6 months    # Overweight: Estimated body mass index is 26.5 kg/m  as calculated from the following:    Height as of this encounter: 1.575 m (5' 2\").    Weight as of this encounter: 65.7 kg (144 lb 14.4 oz).       Significant Results and " Procedures   Most Recent 3 CBC's:  Recent Labs   Lab Test 02/14/24  0640 02/13/24  0522 02/12/24  0840   WBC 5.8 6.7 6.1  6.1   HGB 12.8 12.4 13.0  13.0   MCV 92 93 94  94   * 133* 130*  130*     Most Recent 3 BMP's:  Recent Labs   Lab Test 02/14/24  0640 02/14/24  0131 02/13/24  2111 02/13/24  0813 02/13/24  0522 02/12/24  1209 02/12/24  0840     --   --   --  139  --  135   POTASSIUM 3.8  --   --   --  3.7  --  3.8   CHLORIDE 107  --   --   --  110*  --  106   CO2 21*  --   --   --  18*  --  19*   BUN 4.2*  --   --   --  7.2*  --  17.5   CR 0.43*  --   --   --  0.42*  --  0.53   ANIONGAP 10  --   --   --  11  --  10   JIMMY 7.7*  --   --   --  7.2*  --  7.6*   * 119* 138*   < > 97   < > 138*    < > = values in this interval not displayed.     Most Recent 2 LFT's:  Recent Labs   Lab Test 02/12/24  0840 02/11/24  1755   AST 40 31   ALT 39 38   ALKPHOS 27* 43   BILITOTAL 0.8 1.4*   ,   Results for orders placed or performed during the hospital encounter of 02/11/24   CT Abdomen Pelvis w Contrast    Narrative    EXAM: CT ABDOMEN PELVIS W CONTRAST  LOCATION: [facility]  DATE: 2/11/2024    INDICATION: Abdominal pain, nausea, vomiting, diarrhea and fevers.  COMPARISON: None.  TECHNIQUE: CT scan of the abdomen and pelvis was performed following injection of IV contrast. Multiplanar reformats were obtained. Dose reduction techniques were used.  CONTRAST: 71mL Isovue 370    FINDINGS:   LOWER CHEST: Visualized lungs are clear. No pleural effusion. Heart size normal with no pericardial effusion.  Small esophageal hiatal hernia.    HEPATOBILIARY: Moderate diffuse hepatic steatosis. Liver is otherwise normal. Mild bile duct dilatation with no radiodense stone or mass consistent with reservoir effect from prior cholecystectomy.    PANCREAS: Normal.    SPLEEN: Spleen size normal.    ADRENAL GLANDS: Normal.    KIDNEYS/BLADDER: Kidneys, ureters and bladder are normal.    BOWEL: Mucosal  hyperenhancement and severe circumferential submucosal edema and perienteric edema involving the mid and distal jejunum and the entire ileum as well as mucosal hyperenhancement and milder submucosal edema and pericolic edema affecting the   cecum and ascending colon. Mild mural thickening and liquid stool throughout the remainder of the colon. Findings compatible with a severe nonspecific acute enteritis and milder acute colitis.    Trace amount of free fluid in the abdomen and pelvis. No obstruction, pneumatosis or free air.    LYMPH NODES: No lymphadenopathy.    VASCULATURE: Normal caliber abdominal aorta.   Patent nonstenotic mesenteric and renal arteries. IVC and hepatic, portal, mesenteric, splenic and renal veins patent.    PELVIC ORGANS: No pelvic mass.    MUSCULOSKELETAL: Unremarkable.      Impression    IMPRESSION:   1.  Severe nonspecific acute enteritis and milder acute colitis. Vasculature appears to be normal for this is most likely infectious with inflammatory bowel disease flare not excluded.  2.  Moderate diffuse hepatic steatosis.   3.  Mild bile duct dilatation with no radiodense stone or mass consistent with reservoir effect from prior cholecystectomy.        Follow up/instructions: follow up with her primary care provider in a week.  Recheck her bmp     Pending test results at discharge:      Unresulted Labs Ordered in the Past 30 Days of this Admission       Date and Time Order Name Status Description    2/11/2024  6:34 PM Blood Culture Peripheral Blood Preliminary     2/11/2024  6:34 PM Blood Culture Arm, Right Preliminary              Discharge Orders      Reason for your hospital stay    Food poisoning     Follow-up and recommended labs and tests     Follow up with primary care provider, GAVIN MELGOZA, within 7 days for hospital follow- up.  No follow up labs or test are needed.     Activity    Your activity upon discharge: activity as tolerated     Diet    Follow this diet upon  discharge: Orders Placed This Encounter      Regular Diet Adult       Discharge Disposition   Discharged to home  Condition at discharge: Stable      Consultations This Hospital Stay   PHARMACY TO DOSE VANCO  CARE MANAGEMENT / SOCIAL WORK IP CONSULT  INFECTIOUS DISEASES IP CONSULT  CARE MANAGEMENT / SOCIAL WORK IP CONSULT  SPIRITUAL HEALTH SERVICES IP CONSULT    Code Status   Full Code    Time Spent on this Encounter   IMunir MD, personally saw the patient today and spent less than or equal to 30 minutes discharging this patient. Patient new to me.  Spoke with nursing          This document was created using voice recognition technology.  Please excuse any typographical errors that may have occurred.  Please call with any questions.       Munir Haines MD  [place] [address] [facility]  [address] [place] [address]  Phone: [phone]  Fax: [phone]  ______________________________________________________________________    Physical Exam   Vital Signs: Temp: 98.4  F (36.9  C) Temp src: Oral BP: 131/78 Pulse: 84   Resp: 20 SpO2: 98 % O2 Device: None (Room air)    Weight: 144 lbs 14.4 oz    General appearance: Patient is alert and oriented x3, no apparent distress, pleasant and conversing normally, speaking in full sentences, appears stated age, lying in bed  HEENT:  Mucous membranes are moist  NECK:  supple without bruit or lymphadenopathy  RESPIRATORY: Clear to auscultation bilateral, good air movement  CARDIOVASCULAR: Regular rate and rhythm, normal S1/S2, no murmurs, rubs, or gallops present, peripheral pulses intact  GASTROINTESTINAL: Non-distended, non-tender, soft, bowel sounds present throughout  NEUROLOGIC:  Cranial nerves II-XII intact, without any focal deficits, strength 5/5 throughout  EXTREMITIES:  Moves all extremities, no clubbing, cyanosis, nor edema        Discharge Medications   Discharge Medication List as of 2/14/2024  7:58 AM        CONTINUE  these medications which have NOT CHANGED    Details   alendronate (FOSAMAX) 70 MG tablet Take 70 mg by mouth every 7 days Saturday, Historical      EPINEPHrine (ANY BX GENERIC EQUIV) 0.3 MG/0.3ML injection 2-pack Inject 0.3 mLs (0.3 mg) into the muscle as needed for anaphylaxis May repeat one time in 5-15 minutes if response to initial dose is inadequate., Disp-2 each, R-0, E-Prescribe      FARXIGA 10 MG TABS tablet Take 10 mg by mouth daily, CAM, Historical      gabapentin (NEURONTIN) 600 MG tablet Take 600 mg by mouth 3 times daily, Historical      lisinopril (ZESTRIL) 20 MG tablet Take 20 mg by mouth daily, Historical      metFORMIN (GLUCOPHAGE) 1000 MG tablet Take 1 tablet by mouth 2 times daily, Historical      omeprazole (PRILOSEC) 20 MG DR capsule Take 20 mg by mouth daily, Historical      Semaglutide, 1 MG/DOSE, (OZEMPIC) 4 MG/3ML pen Inject 1 mg Subcutaneous every 7 days Sunday, Historical      simvastatin (ZOCOR) 40 MG tablet Take 40 mg by mouth at bedtime, Historical           Allergies   Allergies   Allergen Reactions    Dulaglutide Rash     LOCAL REACTION TO INJECT SITE    Penicillins Rash     Tolerated Zosyn on 2/11/2024

## 2024-02-16 ENCOUNTER — PATIENT OUTREACH (OUTPATIENT)
Dept: CARE COORDINATION | Facility: CLINIC | Age: 69
End: 2024-02-16
Payer: MEDICARE

## 2024-02-16 LAB
BACTERIA BLD CULT: NO GROWTH
BACTERIA BLD CULT: NO GROWTH

## 2024-02-16 NOTE — PROGRESS NOTES
"Clinic Care Coordination Contact  [facility]: Post-Discharge Note  SITUATION                                                      Admission:    Admission Date: 02/11/24   Reason for Admission: Nausea, Vomiting, Diarrhea, Abdominal pain  Discharge:   Discharge Date: 02/14/24  Discharge Diagnosis: Food poisoning, concern for vibrio cholera, Severe enteritis, Shock with lactic acidosis, Metabolic acidosis with anion gap, Type 2 diabetes, not on insulin dependent, Essential hypertension, Hyperlipidemia, GERD    BACKGROUND                                                      Per hospital discharge summary and inpatient provider notes:    Charlette Rodgers is a 68 year old female with a history of DM on metformin and semaglutide, hypertension, hyperlipidemia, who presented to the emergency room with a chief complaint of nausea vomiting and diarrhea as well as fever.  Patient reports that she was in her usual state of health until approximately midnight last night when she developed the symptoms.  During the day yesterday she did eat raw crab twice.  She denies any blood in her vomit or dark and tarry stools, has had greater than 20 episodes of diarrhea today and multiple episodes of emesis.  She reports abdominal cramping that occurs prior to needing to have bowel movement or vomit.     ASSESSMENT      Discharge Assessment  How are you doing now that you are home?: \"I'm doing better. My stool, is still watery but it's getting better and turning more brown.\"  How are your symptoms? (Red Flag symptoms escalate to triage hotline per guidelines): Improved  Do you feel your condition is stable enough to be safe at home until your provider visit?: Yes  Does the patient have their discharge instructions? : Yes  Does the patient have questions regarding their discharge instructions? : No  Were you started on any new medications or were there changes to any of your previous medications? : No  Does the patient have all of their " medications?: Yes  Do you have questions regarding any of your medications? : No  Do you have all of your needed medical supplies or equipment (DME)?  (i.e. oxygen tank, CPAP, cane, etc.): Yes  Discharge follow-up appointment scheduled within 14 calendar days? : No  Is patient agreeable to assistance with scheduling? : Yes    Post-op (PAVANW CTA Only)  If the patient had a surgery or procedure, do they have any questions for a nurse?: No    PLAN                                                      Outpatient Plan:      Follow-up and recommended labs and tests  Follow up with primary care provider, GAVIN MELGOZA, within 7 days for hospital follow- up. No follow up labs or test are needed.    No future appointments.      For any urgent concerns, please contact our 24 hour nurse triage line: [phone] ([phone])         DEYA Davis  [phone]  [facility]

## 2024-07-02 ENCOUNTER — APPOINTMENT (OUTPATIENT)
Dept: GENERAL RADIOLOGY | Facility: CLINIC | Age: 69
End: 2024-07-02
Attending: EMERGENCY MEDICINE
Payer: MEDICARE

## 2024-07-02 ENCOUNTER — HOSPITAL ENCOUNTER (OUTPATIENT)
Facility: CLINIC | Age: 69
Setting detail: OBSERVATION
Discharge: HOME | End: 2024-07-03
Attending: EMERGENCY MEDICINE | Admitting: INTERNAL MEDICINE
Payer: MEDICARE

## 2024-07-02 ENCOUNTER — APPOINTMENT (OUTPATIENT)
Dept: CT IMAGING | Facility: CLINIC | Age: 69
End: 2024-07-02
Attending: EMERGENCY MEDICINE
Payer: MEDICARE

## 2024-07-02 DIAGNOSIS — R00.0 TACHYCARDIA: ICD-10-CM

## 2024-07-02 DIAGNOSIS — A41.9 SEPSIS WITHOUT ACUTE ORGAN DYSFUNCTION, DUE TO UNSPECIFIED ORGANISM (H): ICD-10-CM

## 2024-07-02 LAB
ALBUMIN UR-MCNC: NEGATIVE MG/DL
ANION GAP SERPL CALCULATED.3IONS-SCNC: 19 MMOL/L (ref 7–15)
APPEARANCE UR: CLEAR
BACTERIA #/AREA URNS HPF: ABNORMAL /HPF
BASE EXCESS BLDV CALC-SCNC: -2.4 MMOL/L (ref -3–3)
BASOPHILS # BLD AUTO: 0 10E3/UL (ref 0–0.2)
BASOPHILS NFR BLD AUTO: 1 %
BILIRUB UR QL STRIP: NEGATIVE
BUN SERPL-MCNC: 38.1 MG/DL (ref 8–23)
CALCIUM SERPL-MCNC: 10.7 MG/DL (ref 8.8–10.2)
CHLORIDE SERPL-SCNC: 101 MMOL/L (ref 98–107)
COLOR UR AUTO: ABNORMAL
CREAT SERPL-MCNC: 0.8 MG/DL (ref 0.51–0.95)
D DIMER PPP FEU-MCNC: 0.28 UG/ML FEU (ref 0–0.5)
DEPRECATED HCO3 PLAS-SCNC: 19 MMOL/L (ref 22–29)
EGFRCR SERPLBLD CKD-EPI 2021: 79 ML/MIN/1.73M2
EOSINOPHIL # BLD AUTO: 0.1 10E3/UL (ref 0–0.7)
EOSINOPHIL NFR BLD AUTO: 2 %
ERYTHROCYTE [DISTWIDTH] IN BLOOD BY AUTOMATED COUNT: 12.5 % (ref 10–15)
GLUCOSE SERPL-MCNC: 189 MG/DL (ref 70–99)
GLUCOSE UR STRIP-MCNC: >=1000 MG/DL
HCO3 BLDV-SCNC: 23 MMOL/L (ref 21–28)
HCT VFR BLD AUTO: 45.2 % (ref 35–47)
HGB BLD-MCNC: 15 G/DL (ref 11.7–15.7)
HGB UR QL STRIP: NEGATIVE
HYALINE CASTS: 3 /LPF
IMM GRANULOCYTES # BLD: 0 10E3/UL
IMM GRANULOCYTES NFR BLD: 0 %
KETONES UR STRIP-MCNC: NEGATIVE MG/DL
LACTATE SERPL-SCNC: 2.4 MMOL/L (ref 0.7–2)
LACTATE SERPL-SCNC: 3 MMOL/L (ref 0.7–2)
LEUKOCYTE ESTERASE UR QL STRIP: ABNORMAL
LYMPHOCYTES # BLD AUTO: 1.5 10E3/UL (ref 0.8–5.3)
LYMPHOCYTES NFR BLD AUTO: 31 %
MCH RBC QN AUTO: 30.7 PG (ref 26.5–33)
MCHC RBC AUTO-ENTMCNC: 33.2 G/DL (ref 31.5–36.5)
MCV RBC AUTO: 92 FL (ref 78–100)
MONOCYTES # BLD AUTO: 0.6 10E3/UL (ref 0–1.3)
MONOCYTES NFR BLD AUTO: 12 %
MUCOUS THREADS #/AREA URNS LPF: PRESENT /LPF
NEUTROPHILS # BLD AUTO: 2.6 10E3/UL (ref 1.6–8.3)
NEUTROPHILS NFR BLD AUTO: 54 %
NITRATE UR QL: NEGATIVE
NRBC # BLD AUTO: 0 10E3/UL
NRBC BLD AUTO-RTO: 0 /100
O2/TOTAL GAS SETTING VFR VENT: 0 %
OXYHGB MFR BLDV: 66 % (ref 70–75)
PCO2 BLDV: 39 MM HG (ref 40–50)
PH BLDV: 7.37 [PH] (ref 7.32–7.43)
PH UR STRIP: 5.5 [PH] (ref 5–7)
PLATELET # BLD AUTO: 163 10E3/UL (ref 150–450)
PO2 BLDV: 36 MM HG (ref 25–47)
POTASSIUM SERPL-SCNC: 4.3 MMOL/L (ref 3.4–5.3)
PROCALCITONIN SERPL IA-MCNC: 0.05 NG/ML
RBC # BLD AUTO: 4.89 10E6/UL (ref 3.8–5.2)
RBC URINE: <1 /HPF
SAO2 % BLDV: 66.8 % (ref 70–75)
SODIUM SERPL-SCNC: 139 MMOL/L (ref 135–145)
SP GR UR STRIP: 1.02 (ref 1–1.03)
SQUAMOUS EPITHELIAL: <1 /HPF
TROPONIN T SERPL HS-MCNC: 8 NG/L
UROBILINOGEN UR STRIP-MCNC: NORMAL MG/DL
WBC # BLD AUTO: 4.9 10E3/UL (ref 4–11)
WBC URINE: 11 /HPF

## 2024-07-02 PROCEDURE — 258N000003 HC RX IP 258 OP 636: Performed by: EMERGENCY MEDICINE

## 2024-07-02 PROCEDURE — 36415 COLL VENOUS BLD VENIPUNCTURE: CPT | Performed by: EMERGENCY MEDICINE

## 2024-07-02 PROCEDURE — 84145 PROCALCITONIN (PCT): CPT | Performed by: EMERGENCY MEDICINE

## 2024-07-02 PROCEDURE — 86140 C-REACTIVE PROTEIN: CPT | Performed by: INTERNAL MEDICINE

## 2024-07-02 PROCEDURE — 250N000011 HC RX IP 250 OP 636: Performed by: EMERGENCY MEDICINE

## 2024-07-02 PROCEDURE — 93005 ELECTROCARDIOGRAM TRACING: CPT

## 2024-07-02 PROCEDURE — 85025 COMPLETE CBC W/AUTO DIFF WBC: CPT | Performed by: EMERGENCY MEDICINE

## 2024-07-02 PROCEDURE — 99222 1ST HOSP IP/OBS MODERATE 55: CPT | Mod: AI | Performed by: INTERNAL MEDICINE

## 2024-07-02 PROCEDURE — 71045 X-RAY EXAM CHEST 1 VIEW: CPT

## 2024-07-02 PROCEDURE — 96361 HYDRATE IV INFUSION ADD-ON: CPT

## 2024-07-02 PROCEDURE — 85379 FIBRIN DEGRADATION QUANT: CPT | Performed by: EMERGENCY MEDICINE

## 2024-07-02 PROCEDURE — 87086 URINE CULTURE/COLONY COUNT: CPT | Performed by: EMERGENCY MEDICINE

## 2024-07-02 PROCEDURE — 80053 COMPREHEN METABOLIC PANEL: CPT | Performed by: EMERGENCY MEDICINE

## 2024-07-02 PROCEDURE — 87637 SARSCOV2&INF A&B&RSV AMP PRB: CPT | Performed by: EMERGENCY MEDICINE

## 2024-07-02 PROCEDURE — 80048 BASIC METABOLIC PNL TOTAL CA: CPT | Performed by: EMERGENCY MEDICINE

## 2024-07-02 PROCEDURE — 82248 BILIRUBIN DIRECT: CPT | Performed by: INTERNAL MEDICINE

## 2024-07-02 PROCEDURE — 71260 CT THORAX DX C+: CPT | Mod: MG

## 2024-07-02 PROCEDURE — 99285 EMERGENCY DEPT VISIT HI MDM: CPT | Mod: 25

## 2024-07-02 PROCEDURE — 82805 BLOOD GASES W/O2 SATURATION: CPT | Performed by: EMERGENCY MEDICINE

## 2024-07-02 PROCEDURE — 96374 THER/PROPH/DIAG INJ IV PUSH: CPT | Mod: 59

## 2024-07-02 PROCEDURE — 83605 ASSAY OF LACTIC ACID: CPT | Performed by: EMERGENCY MEDICINE

## 2024-07-02 PROCEDURE — 81001 URINALYSIS AUTO W/SCOPE: CPT | Performed by: EMERGENCY MEDICINE

## 2024-07-02 PROCEDURE — 84484 ASSAY OF TROPONIN QUANT: CPT | Performed by: EMERGENCY MEDICINE

## 2024-07-02 RX ORDER — SEMAGLUTIDE 2.68 MG/ML
2 INJECTION, SOLUTION SUBCUTANEOUS
COMMUNITY

## 2024-07-02 RX ORDER — ASPIRIN 81 MG/1
81 TABLET ORAL AT BEDTIME
COMMUNITY

## 2024-07-02 RX ORDER — MULTIPLE VITAMINS W/ MINERALS TAB 9MG-400MCG
1 TAB ORAL DAILY
COMMUNITY

## 2024-07-02 RX ORDER — IOPAMIDOL 755 MG/ML
500 INJECTION, SOLUTION INTRAVASCULAR ONCE
Status: COMPLETED | OUTPATIENT
Start: 2024-07-02 | End: 2024-07-02

## 2024-07-02 RX ORDER — CEFEPIME HYDROCHLORIDE 1 G/1
1 INJECTION, POWDER, FOR SOLUTION INTRAMUSCULAR; INTRAVENOUS ONCE
Status: COMPLETED | OUTPATIENT
Start: 2024-07-02 | End: 2024-07-03

## 2024-07-02 RX ADMIN — SODIUM CHLORIDE 1000 ML: 9 INJECTION, SOLUTION INTRAVENOUS at 21:17

## 2024-07-02 RX ADMIN — SODIUM CHLORIDE 1000 ML: 9 INJECTION, SOLUTION INTRAVENOUS at 23:46

## 2024-07-02 RX ADMIN — CEFEPIME 1 G: 1 INJECTION, POWDER, FOR SOLUTION INTRAMUSCULAR; INTRAVENOUS at 23:46

## 2024-07-02 RX ADMIN — IOPAMIDOL 72 ML: 755 INJECTION, SOLUTION INTRAVENOUS at 22:15

## 2024-07-02 ASSESSMENT — ACTIVITIES OF DAILY LIVING (ADL)
ADLS_ACUITY_SCORE: 38
ADLS_ACUITY_SCORE: 36
ADLS_ACUITY_SCORE: 38

## 2024-07-02 ASSESSMENT — COLUMBIA-SUICIDE SEVERITY RATING SCALE - C-SSRS
2. HAVE YOU ACTUALLY HAD ANY THOUGHTS OF KILLING YOURSELF IN THE PAST MONTH?: NO
6. HAVE YOU EVER DONE ANYTHING, STARTED TO DO ANYTHING, OR PREPARED TO DO ANYTHING TO END YOUR LIFE?: NO
1. IN THE PAST MONTH, HAVE YOU WISHED YOU WERE DEAD OR WISHED YOU COULD GO TO SLEEP AND NOT WAKE UP?: NO

## 2024-07-03 VITALS
RESPIRATION RATE: 18 BRPM | SYSTOLIC BLOOD PRESSURE: 117 MMHG | BODY MASS INDEX: 27.56 KG/M2 | DIASTOLIC BLOOD PRESSURE: 78 MMHG | TEMPERATURE: 98 F | HEIGHT: 60 IN | WEIGHT: 140.4 LBS | OXYGEN SATURATION: 95 % | HEART RATE: 112 BPM

## 2024-07-03 LAB
ALBUMIN SERPL BCG-MCNC: 4.7 G/DL (ref 3.5–5.2)
ALP SERPL-CCNC: 47 U/L (ref 40–150)
ALT SERPL W P-5'-P-CCNC: 49 U/L (ref 0–50)
AST SERPL W P-5'-P-CCNC: 25 U/L (ref 0–45)
ATRIAL RATE - MUSE: 121 BPM
BILIRUB DIRECT SERPL-MCNC: 0.26 MG/DL (ref 0–0.3)
BILIRUB SERPL-MCNC: 1.1 MG/DL
CRP SERPL-MCNC: <3 MG/L
DIASTOLIC BLOOD PRESSURE - MUSE: NORMAL MMHG
FLUAV RNA SPEC QL NAA+PROBE: NEGATIVE
FLUBV RNA RESP QL NAA+PROBE: NEGATIVE
HOLD SPECIMEN: NORMAL
INTERPRETATION ECG - MUSE: NORMAL
LACTATE SERPL-SCNC: 1.8 MMOL/L (ref 0.7–2)
LIPASE SERPL-CCNC: 46 U/L (ref 13–60)
P AXIS - MUSE: 46 DEGREES
PR INTERVAL - MUSE: 134 MS
PROT SERPL-MCNC: 7.7 G/DL (ref 6.4–8.3)
QRS DURATION - MUSE: 84 MS
QT - MUSE: 304 MS
QTC - MUSE: 431 MS
R AXIS - MUSE: 6 DEGREES
RSV RNA SPEC NAA+PROBE: NEGATIVE
SARS-COV-2 RNA RESP QL NAA+PROBE: NEGATIVE
SYSTOLIC BLOOD PRESSURE - MUSE: NORMAL MMHG
T AXIS - MUSE: 32 DEGREES
VENTRICULAR RATE- MUSE: 121 BPM

## 2024-07-03 PROCEDURE — G0378 HOSPITAL OBSERVATION PER HR: HCPCS

## 2024-07-03 PROCEDURE — 99239 HOSP IP/OBS DSCHRG MGMT >30: CPT | Performed by: PHYSICIAN ASSISTANT

## 2024-07-03 PROCEDURE — 36415 COLL VENOUS BLD VENIPUNCTURE: CPT | Performed by: INTERNAL MEDICINE

## 2024-07-03 PROCEDURE — 83605 ASSAY OF LACTIC ACID: CPT | Performed by: INTERNAL MEDICINE

## 2024-07-03 PROCEDURE — 83690 ASSAY OF LIPASE: CPT | Performed by: INTERNAL MEDICINE

## 2024-07-03 RX ORDER — POLYETHYLENE GLYCOL 3350 17 G/17G
17 POWDER, FOR SOLUTION ORAL DAILY
Status: DISCONTINUED | OUTPATIENT
Start: 2024-07-03 | End: 2024-07-03 | Stop reason: HOSPADM

## 2024-07-03 RX ORDER — GABAPENTIN 600 MG/1
600 TABLET ORAL 3 TIMES DAILY
Status: DISCONTINUED | OUTPATIENT
Start: 2024-07-03 | End: 2024-07-03 | Stop reason: HOSPADM

## 2024-07-03 RX ORDER — ACETAMINOPHEN 650 MG/1
650 SUPPOSITORY RECTAL EVERY 4 HOURS PRN
Status: DISCONTINUED | OUTPATIENT
Start: 2024-07-03 | End: 2024-07-03 | Stop reason: HOSPADM

## 2024-07-03 RX ORDER — ACETAMINOPHEN 325 MG/1
650 TABLET ORAL EVERY 4 HOURS PRN
Status: DISCONTINUED | OUTPATIENT
Start: 2024-07-03 | End: 2024-07-03 | Stop reason: HOSPADM

## 2024-07-03 RX ORDER — ASPIRIN 81 MG/1
81 TABLET ORAL AT BEDTIME
Status: DISCONTINUED | OUTPATIENT
Start: 2024-07-03 | End: 2024-07-03 | Stop reason: HOSPADM

## 2024-07-03 RX ORDER — AMOXICILLIN 250 MG
2 CAPSULE ORAL 2 TIMES DAILY PRN
Status: DISCONTINUED | OUTPATIENT
Start: 2024-07-03 | End: 2024-07-03 | Stop reason: HOSPADM

## 2024-07-03 RX ORDER — LISINOPRIL 20 MG/1
20 TABLET ORAL AT BEDTIME
Status: SHIPPED
Start: 2024-07-05

## 2024-07-03 RX ORDER — ONDANSETRON 2 MG/ML
4 INJECTION INTRAMUSCULAR; INTRAVENOUS EVERY 6 HOURS PRN
Status: DISCONTINUED | OUTPATIENT
Start: 2024-07-03 | End: 2024-07-03 | Stop reason: HOSPADM

## 2024-07-03 RX ORDER — ONDANSETRON 4 MG/1
4 TABLET, ORALLY DISINTEGRATING ORAL EVERY 6 HOURS PRN
Status: DISCONTINUED | OUTPATIENT
Start: 2024-07-03 | End: 2024-07-03 | Stop reason: HOSPADM

## 2024-07-03 RX ORDER — PANTOPRAZOLE SODIUM 40 MG/1
40 TABLET, DELAYED RELEASE ORAL
Status: DISCONTINUED | OUTPATIENT
Start: 2024-07-03 | End: 2024-07-03 | Stop reason: HOSPADM

## 2024-07-03 RX ORDER — AMOXICILLIN 250 MG
1 CAPSULE ORAL 2 TIMES DAILY PRN
Status: DISCONTINUED | OUTPATIENT
Start: 2024-07-03 | End: 2024-07-03 | Stop reason: HOSPADM

## 2024-07-03 ASSESSMENT — ACTIVITIES OF DAILY LIVING (ADL)
ADLS_ACUITY_SCORE: 36
ADLS_ACUITY_SCORE: 38
ADLS_ACUITY_SCORE: 38

## 2024-07-03 NOTE — ED PROVIDER NOTES
Briefly, this is a 68-year-old woman who presents to the emergency department with her daughter for 3-day history of nausea, 1 episode of vomiting, chills, palpitations and fatigue.  Please see Dr. Bejarano's note for full details.  I was asked to follow-up on her CT scan, urinalysis and COVID and influenza swab.  CT, urinalysis were unrevealing as far as clear infectious pathology.  Low concern for meningitis.  No history of tick bites.  On exam, she has no significant rashes or evidence of cellulitis.  Repeat lactate came down from 3.0 to 2.4.  Blood cultures are pending.  Due to the patient's persistent tachycardia and elevated lactate will be admitted for obs as we monitor blood cultures and watch for further signs of possible infection.  I spoke with Dr. Harris regarding admission who very graciously accepted the patient under her care.       Denny Barahona MD  07/02/24 2647

## 2024-07-03 NOTE — PLAN OF CARE
ROOM # 201    Living Situation (if not independent, order SW consult): lives with daughter  Facility name:  : [facility] [phone] (daughter)    Activity level at baseline: IND  Activity level on admit: SBA    Who will be transporting you at discharge: [facility] (daughter)    Patient registered to observation; given Patient Bill of Rights; given the opportunity to ask questions about observation status and their plan of care.  Patient has been oriented to the observation room, bathroom and call light is in place.    Discussed discharge goals and expectations with patient/family.

## 2024-07-03 NOTE — PLAN OF CARE
"PRIMARY DIAGNOSIS: \"GENERIC\" NURSING  OUTPATIENT/OBSERVATION GOALS TO BE MET BEFORE DISCHARGE:  ADLs back to baseline: Yes    Activity and level of assistance: Up with standby assistance.    Pain status: Pain free.    Return to near baseline physical activity: Yes     Discharge Planner Nurse   Safe discharge environment identified: Yes  Barriers to discharge: No       Entered by: Annabel Hernandez RN      Please review provider order for any additional goals.   Nurse to notify provider when observation goals have been met and patient is ready for discharge.  Goal Outcome Evaluation:      Plan of Care Reviewed With: patient, child    Overall Patient Progress: improvingOverall Patient Progress: improving    Outcome Evaluation: A&Ox4, VSS on RA, HR slightly elevated. SBA. Tolerating regular diet. Daughter at bedside.      Problem: Adult Inpatient Plan of Care  Goal: Plan of Care Review  Description: The Plan of Care Review/Shift note should be completed every shift.  The Outcome Evaluation is a brief statement about your assessment that the patient is improving, declining, or no change.  This information will be displayed automatically on your shift  note.  Outcome: Progressing  Flowsheets (Taken 7/3/2024 1042)  Outcome Evaluation: A&Ox4, VSS on RA, HR slightly elevated. SBA. Tolerating regular diet. Daughter at bedside.  Plan of Care Reviewed With:   patient   child  Overall Patient Progress: improving  Goal: Absence of Hospital-Acquired Illness or Injury  Intervention: Identify and Manage Fall Risk  Recent Flowsheet Documentation  Taken 7/3/2024 0845 by Annabel Hernandez RN  Safety Promotion/Fall Prevention:   activity supervised   assistive device/personal items within reach   clutter free environment maintained   nonskid shoes/slippers when out of bed   safety round/check completed   room organization consistent  Intervention: Prevent Skin Injury  Recent Flowsheet Documentation  Taken 7/3/2024 0845 by Mary" Annabel, RN  Body Position: position changed independently  Intervention: Prevent Infection  Recent Flowsheet Documentation  Taken 7/3/2024 0845 by Annabel Hernandez, RN  Infection Prevention:   rest/sleep promoted   hand hygiene promoted

## 2024-07-03 NOTE — DISCHARGE SUMMARY
[facility]  Hospitalist Discharge Summary      Date of Admission:  7/2/2024  Date of Discharge:  7/3/2024  Discharging Provider: Linda Finley PA-C  Discharge Service: Hospitalist Service    Discharge Diagnoses   Dehydration  Tachycardia    Clinically Significant Risk Factors     # DMII: A1C = N/A within past 6 months  # Overweight: Estimated body mass index is 27.42 kg/m  as calculated from the following:    Height as of this encounter: 1.524 m (5').    Weight as of this encounter: 63.7 kg (140 lb 6.4 oz).       Follow-ups Needed After Discharge   Follow-up Appointments     Follow-up and recommended labs and tests       Follow up with primary care provider, GAVIN MELGOZA, within 7 days to   evaluate medication change and for hospital follow- up.  We are holding   your Lisinopril until 7/5 given normal blood pressure levels - monitor   with home BP monitoring and restart as appropriate, may need to hold   longer          Unresulted Labs Ordered in the Past 30 Days of this Admission       Date and Time Order Name Status Description    7/2/2024 11:17 PM Urine Culture In process           Discharge Disposition   Discharged to home  Condition at discharge: Stable    Hospital Course   Charlette Rodgers is a 68 year-old female with past medical history significant for hypertension, hyperlipidemia, diabetes mellitus type II, peripheral neuropathy, GERD and osteoporosis who was admitted to [facility] on 7/3/2024 following constellation of symptoms of unclear etiology, feeling overall unwell since 7/1. Work-up      Dehydration  Tachycardia   Viral syndrome vs bacterial or alternative infectious process  No recent travel, no abdominal pain or diarrhea or sick contacts. One episode of emesis. Tachycardic initially in ED, improved after 2L normal saline. Afebrile. Respiratory status stable. Received 1x Cefepime in ED.  - Viral panel negative.  - Lactic cleared, likely secondary to  dehydration, poor PO.   - CBC with diff wnl, Procal 0.05.  - UA with small leuk, 11 wbcs, few bacteria.  - CT CAP negative for acute pathology.  - Hold further abx given afebrile, w/up negative.  - Monitored on telemetry.     ??transient cyanosis   No clear ideas on this. It's apparently happened in the past although not this severely. ?methylene blue but I don't think it would resolve itself so quickly. Maybe an unusual type of raynaud's?? Discussed with patient and daughter to follow with PCP and monitor for triggers.    Past medical history:  Hypertension   Hyperlipidemia   - Continue PTA ASA  - Hold statin during stay, resume at discharge  - Hold Lisinopril 20 mg daily given normotension - restart 7/5 if appropriate, discussed with patient and her daughter. They will monitor blood pressure with home monitoring. Follow-up with PCP.     GERD   - Continue PTA Prilosec     Non-Insulin dependent type 2 diabetes  PTA Regimen: metformin 1g BID, Semalgutide 2 mg q week, Dapagliflozin  - Hypoglycemia protocol  - Preprandial and HS fingerstick checks  - Hold PTA oral antiglycemics     Osteoporosis   - Hold PTA while admitted     Incidental Findings:  Mild hepatic steatosis  Tortuosity of the adnexal vessels bilaterally, with prominent bilateral gonadal veins, left greater than right, this may reflect underlying pelvic congestion syndrome, similar findings were present on prior CT from 2/11/2024.    Consultations This Hospital Stay   ADVANCE DIRECTIVE IP CONSULT    Code Status   Full Code    Time Spent on this Encounter   I, Linda Finley PA-C, personally saw the patient today and spent greater than 30 minutes discharging this patient.       Linda Finley PA-C  [facility] OBSERVATION DEPT  [facility]– [place]note 2[place] [address] [place] [address]  Phone: [phone]  ______________________________________________________________________    Physical Exam   Vital Signs: Temp: 98  F (36.7  C) Temp src: Oral BP:  117/78 Pulse: 112   Resp: 18 SpO2: 95 % O2 Device: None (Room air)    Weight: 140 lbs 6.4 oz  General: Awake, alert, pleasant 68 female who appears stated age. Looks comfortable. No acute distress.  HEENT: Normocephalic, atraumatic  Respiratory: Clear to auscultation bilaterally, stable on room air  Cardiovascular: Regular rhythm, mild tachycardia  Gastrointestinal: Soft, non-tender, non-distended. Bowel sounds present.  Skin: Warm, dry. Dorsalis pedis pulses palpable bilaterally.  Musculoskeletal: Moves all extremities equally.  Neurologic: AAO x3, normal strength and sensation.  Psychiatric: Appropriate mood and affect.     Primary Care Physician   GAVIN MELGOZA    Discharge Orders      Reason for your hospital stay    Nausea, tachycardia     Follow-up and recommended labs and tests     Follow up with primary care provider, GAVIN MELGOZA, within 7 days to evaluate medication change and for hospital follow- up.  We are holding your Lisinopril until 7/5 given normal blood pressure levels - monitor with home BP monitoring and restart as appropriate, may need to hold longer     Activity    Your activity upon discharge: activity as tolerated and no driving for today     Diet    Follow this diet upon discharge: Orders Placed This Encounter      Regular Diet Adult       Significant Results and Procedures   Results for orders placed or performed during the hospital encounter of 07/02/24   XR Chest Port 1 View    Narrative    EXAM: XR CHEST PORT 1 VIEW  LOCATION: [facility]  DATE: 7/2/2024    INDICATION: Heart size and vascularity are normal.  COMPARISON: CT abdomen pelvis 2/11/2024      Impression    IMPRESSION: Negative chest. Prominent right-sided chondral calcifications noted.   CT Chest/Abdomen/Pelvis w Contrast    Narrative    EXAM: CT CHEST/ABDOMEN/PELVIS W CONTRAST  LOCATION: [facility]  DATE: 7/2/2024    INDICATION: tachycardic, chest pain, nausea, borderline  hypoxia  COMPARISON: 2/11/2024.  TECHNIQUE: CT scan of the chest, abdomen, and pelvis was performed following injection of IV contrast. Multiplanar reformats were obtained. Dose reduction techniques were used.   CONTRAST: 72ml Isovue 370    FINDINGS:   LUNGS AND PLEURA: Lungs are clear. No pleural effusions.    MEDIASTINUM/AXILLAE: No lymphadenopathy. No thoracic aortic aneurysms.    CORONARY ARTERY CALCIFICATION: Moderate.    HEPATOBILIARY: No significant mass or bile duct dilatation. Mild hepatic steatosis. No calcified gallstones.  The gallbladder surgically absent.    PANCREAS: No significant mass, duct dilatation, or inflammatory change.    SPLEEN: Normal.    ADRENAL GLANDS: No significant nodules.    KIDNEYS/BLADDER: No significant mass, stone, or hydronephrosis.    BOWEL: No obstruction or inflammatory change.    LYMPH NODES: No lymphadenopathy.    VASCULATURE: No abdominal aortic aneurysm. Tortuosity of the adnexal vessels are noted bilaterally, slightly worse on the left than the right with prominence of the gonadal veins bilaterally.    PELVIC ORGANS: No pelvic masses.    MUSCULOSKELETAL: Multilevel discogenic and posterior facet degenerative changes of the thoracolumbar spine are noted most pronounced at the  T12 L1 level where there is a large posterior disc osteophyte complex. Moderate degenerative osteoarthritic   changes of the left sternoclavicular joint are noted.      Impression    IMPRESSION:  1.  No acute intrathoracic or intra-abdominal process identified.   2.  Mild hepatic steatosis  3.  Tortuosity of the adnexal vessels bilaterally, with prominent bilateral gonadal veins, left greater than right, this may reflect underlying pelvic congestion syndrome, similar findings were present on prior CT from 2/11/2024.       Discharge Medications   Current Discharge Medication List        CONTINUE these medications which have CHANGED    Details   lisinopril (ZESTRIL) 20 MG tablet Take 1 tablet (20 mg)  by mouth at bedtime    Associated Diagnoses: Tachycardia           CONTINUE these medications which have NOT CHANGED    Details   alendronate (FOSAMAX) 70 MG tablet Take 70 mg by mouth every 7 days Saturday      aspirin 81 MG EC tablet Take 81 mg by mouth at bedtime      calcium carbonate-vitamin D (OSCAL) 500-5 MG-MCG tablet Take 1 tablet by mouth daily      EPINEPHrine (ANY BX GENERIC EQUIV) 0.3 MG/0.3ML injection 2-pack Inject 0.3 mLs (0.3 mg) into the muscle as needed for anaphylaxis May repeat one time in 5-15 minutes if response to initial dose is inadequate.  Qty: 2 each, Refills: 0      FARXIGA 10 MG TABS tablet Take 10 mg by mouth daily      gabapentin (NEURONTIN) 600 MG tablet Take 600 mg by mouth 3 times daily      metFORMIN (GLUCOPHAGE) 1000 MG tablet Take 1 tablet by mouth 2 times daily      multivitamin w/minerals (MULTI-VITAMIN) tablet Take 1 tablet by mouth daily      omeprazole (PRILOSEC) 20 MG DR capsule Take 20 mg by mouth daily      Semaglutide, 2 MG/DOSE, (OZEMPIC, 2 MG/DOSE,) 8 MG/3ML pen Inject 2 mg Subcutaneous every 7 days      simvastatin (ZOCOR) 40 MG tablet Take 40 mg by mouth at bedtime           Allergies   Allergies   Allergen Reactions    Dulaglutide Rash     LOCAL REACTION TO INJECT SITE    Penicillins Rash     Tolerated Zosyn on 2/11/2024

## 2024-07-03 NOTE — PHARMACY-ADMISSION MEDICATION HISTORY
Pharmacist Admission Medication History    Admission medication history is complete. The information provided in this note is only as accurate as the sources available at the time of the update.    Information Source(s): Patient and Family member via in-person    Pertinent Information: outside meds    Changes made to PTA medication list:  Added: MVI, calcium with D, asa, ozempic SQ  Deleted: None  Changed: None    Allergies reviewed with patient and updates made in EHR: yes    Medication History Completed By: Anthony Gonzalez RPH 7/2/2024 10:48 PM    PTA Med List   Medication Sig Last Dose    alendronate (FOSAMAX) 70 MG tablet Take 70 mg by mouth every 7 days Saturday Past Week at sat    aspirin 81 MG EC tablet Take 81 mg by mouth at bedtime 7/1/2024 at hs    calcium carbonate-vitamin D (OSCAL) 500-5 MG-MCG tablet Take 1 tablet by mouth daily 7/2/2024 at am    EPINEPHrine (ANY BX GENERIC EQUIV) 0.3 MG/0.3ML injection 2-pack Inject 0.3 mLs (0.3 mg) into the muscle as needed for anaphylaxis May repeat one time in 5-15 minutes if response to initial dose is inadequate. prn at prn    FARXIGA 10 MG TABS tablet Take 10 mg by mouth daily 7/2/2024 at am    gabapentin (NEURONTIN) 600 MG tablet Take 600 mg by mouth 3 times daily 7/2/2024 at am    lisinopril (ZESTRIL) 20 MG tablet Take 20 mg by mouth at bedtime 7/1/2024 at hs    metFORMIN (GLUCOPHAGE) 1000 MG tablet Take 1 tablet by mouth 2 times daily 7/2/2024 at am    multivitamin w/minerals (MULTI-VITAMIN) tablet Take 1 tablet by mouth daily 7/2/2024 at am    omeprazole (PRILOSEC) 20 MG DR capsule Take 20 mg by mouth daily 7/2/2024 at am    Semaglutide, 2 MG/DOSE, (OZEMPIC, 2 MG/DOSE,) 8 MG/3ML pen Inject 2 mg Subcutaneous every 7 days Past Week at sun    simvastatin (ZOCOR) 40 MG tablet Take 40 mg by mouth at bedtime 7/1/2024 at hs

## 2024-07-03 NOTE — ED NOTES
[facility]  ED Nurse Handoff Report    ED Chief complaint: Flu Symptoms  . ED Diagnosis:   Final diagnoses:   Sepsis without acute organ dysfunction, due to unspecified organism (H)   Tachycardia       Allergies:   Allergies   Allergen Reactions    Dulaglutide Rash     LOCAL REACTION TO INJECT SITE    Penicillins Rash     Tolerated Zosyn on 2/11/2024       Code Status: Full Code    Activity level - Baseline/Home:  independent.  Activity Level - Current:   independent.   Lift room needed: No.   Bariatric: No   Needed: No   Isolation: No.   Infection: Not Applicable.     Respiratory status: Room air    Vital Signs (within 30 minutes):   Vitals:    07/02/24 2124 07/02/24 2130 07/02/24 2200 07/02/24 2300   BP: 108/71 117/78 117/74 115/74   Pulse: 114 112 113    Resp: 26 13 24    Temp:       TempSrc:       SpO2: 95% 96% 97% 95%       Cardiac Rhythm:  ,      Pain level:    Patient confused: No.   Patient Falls Risk: arm band in place, patient and family education, and activity supervised.   Elimination Status: Has voided     Patient Report - Initial Complaint: Generalized unwell feeling , nausea   Focused Assessment: Charlette Rodgers is a 68 year old female arrives with daughter via car for evaluation of 3-day history of nausea.  She has had 1 episode of vomiting today and daughter notes that she has begun to complain of shortness of breath, palpitations,  And was noted at home to have a blood pressure of 92/64 and heart rate of 132.  Daughter notes that briefly, she appeared to have some blue lips.  She has not been febrile, she has not had diarrhea, URI symptoms or cough.     Most recent travel history was April 2024 to [place] in [place].  She has no known ill contacts.     Abnormal Results:   Labs Ordered and Resulted from Time of ED Arrival to Time of ED Departure   BASIC METABOLIC PANEL - Abnormal       Result Value    Sodium 139      Potassium 4.3      Chloride 101      Carbon Dioxide  (CO2) 19 (*)     Anion Gap 19 (*)     Urea Nitrogen 38.1 (*)     Creatinine 0.80      GFR Estimate 79      Calcium 10.7 (*)     Glucose 189 (*)    LACTIC ACID WHOLE BLOOD WITH 1X REPEAT IN 2 HR WHEN >2 - Abnormal    Lactic Acid, Initial 3.0 (*)    BLOOD GAS VENOUS - Abnormal    pH Venous 7.37      pCO2 Venous 39 (*)     pO2 Venous 36      Bicarbonate Venous 23      Base Excess/Deficit Venous -2.4      FIO2 0      Oxyhemoglobin Venous 66 (*)     O2 Sat, Venous 66.8 (*)    ROUTINE UA WITH MICROSCOPIC REFLEX TO CULTURE - Abnormal    Color Urine Light Yellow      Appearance Urine Clear      Glucose Urine >=1000 (*)     Bilirubin Urine Negative      Ketones Urine Negative      Specific Gravity Urine 1.022      Blood Urine Negative      pH Urine 5.5      Protein Albumin Urine Negative      Urobilinogen Urine Normal      Nitrite Urine Negative      Leukocyte Esterase Urine Small (*)     Bacteria Urine Few (*)     Mucus Urine Present (*)     RBC Urine <1      WBC Urine 11 (*)     Squamous Epithelials Urine <1      Hyaline Casts Urine 3 (*)    LACTIC ACID WHOLE BLOOD - Abnormal    Lactic Acid 2.4 (*)    D DIMER QUANTITATIVE - Normal    D-Dimer Quantitative 0.28     TROPONIN T, HIGH SENSITIVITY - Normal    Troponin T, High Sensitivity 8     PROCALCITONIN - Normal    Procalcitonin 0.05     CBC WITH PLATELETS AND DIFFERENTIAL    WBC Count 4.9      RBC Count 4.89      Hemoglobin 15.0      Hematocrit 45.2      MCV 92      MCH 30.7      MCHC 33.2      RDW 12.5      Platelet Count 163      % Neutrophils 54      % Lymphocytes 31      % Monocytes 12      % Eosinophils 2      % Basophils 1      % Immature Granulocytes 0      NRBCs per 100 WBC 0      Absolute Neutrophils 2.6      Absolute Lymphocytes 1.5      Absolute Monocytes 0.6      Absolute Eosinophils 0.1      Absolute Basophils 0.0      Absolute Immature Granulocytes 0.0      Absolute NRBCs 0.0     INFLUENZA A/B, RSV, & SARS-COV2 PCR   URINE CULTURE        CT  Chest/Abdomen/Pelvis w Contrast   Final Result   IMPRESSION:   1.  No acute intrathoracic or intra-abdominal process identified.    2.  Mild hepatic steatosis   3.  Tortuosity of the adnexal vessels bilaterally, with prominent bilateral gonadal veins, left greater than right, this may reflect underlying pelvic congestion syndrome, similar findings were present on prior CT from 2/11/2024.      XR Chest Port 1 View   Final Result   IMPRESSION: Negative chest. Prominent right-sided chondral calcifications noted.          Treatments provided: See MAR   Family Comments: daughter   OBS brochure/video discussed/provided to patient:  Yes  ED Medications:   Medications   sodium chloride 0.9% BOLUS 1,000 mL (1,000 mLs Intravenous $New Bag 7/2/24 2166)   ceFEPIme (MAXIPIME) 1 g vial to attach to  mL bag for ADULTS or NS 50 mL bag for PEDS (1 g Intravenous $New Bag 7/2/24 4306)   sodium chloride 0.9% BOLUS 1,000 mL (0 mLs Intravenous Stopped 7/2/24 2315)   iopamidol (ISOVUE-370) solution 500 mL (72 mLs Intravenous $Given 7/2/24 2215)   sodium chloride (PF) 0.9% PF flush 100 mL (58 mLs Intravenous $Given 7/2/24 2215)       Drips infusing:  No  For the majority of the shift this patient was Green.   Interventions performed were NA .    Sepsis treatment initiated: No    Cares/treatment/interventions/medications to be completed following ED care: Blood culture 2 sets drawn and sent    ED Nurse Name: Teddy Goodson RN  12:00 AM         RECEIVING UNIT ED HANDOFF REVIEW    Above ED Nurse Handoff Report was reviewed: Yes  Reviewed by: Wisam Chaudhry RN on July 3, 2024 at 2:15 AM   KILO Russell called the ED to inform them the note was read: No

## 2024-07-03 NOTE — PLAN OF CARE
PRIMARY DIAGNOSIS: Flu-like symptoms and persistent tachycardia  OUTPATIENT/OBSERVATION GOALS TO BE MET BEFORE DISCHARGE:  ADLs back to baseline: Yes    Activity and level of assistance: Up with standby assistance.    Pain status: Pain free.    Return to near baseline physical activity: Yes     Discharge Planner Nurse   Safe discharge environment identified: Yes  Barriers to discharge: Yes       Entered by: Wisam Chaudhry RN 07/03/2024 4:27 AM     Please review provider order for any additional goals.   Nurse to notify provider when observation goals have been met and patient is ready for discharge.      Pt is Aox4 and calm. Pt and family did request ACP consult. Pt is on tele and placed on SBA d/t feeling a little dizzy when ambulating from cart to bed. Labs to be checked in the morning with possible discharge to home during the day. Continue to follow plan of care.

## 2024-07-03 NOTE — ED PROVIDER NOTES
Emergency Department Note      History of Present Illness     Chief Complaint   Flu Symptoms      HPI   Charlette Rodgers is a 68 year old female arrives with daughter via car for evaluation of 3-day history of nausea.  She has had 1 episode of vomiting today and daughter notes that she has begun to complain of shortness of breath, palpitations,  And was noted at home to have a blood pressure of 92/64 and heart rate of 132.  Daughter notes that briefly, she appeared to have some blue lips.  She has not been febrile, she has not had diarrhea, URI symptoms or cough.    Most recent travel history was April 2024 to [place] in [place].  She has no known ill contacts.      Independent Historian   Daughter as detailed above.    Review of External Notes   I reviewed hospitalization from February 11 through February 14 at [facility].  She presented with profuse diarrhea and was found to have severe enteritis secondary to food poisoning.  She was hypotensive in the ER at that time and required fluid resuscitation and admission to the ICU, but no pressor support.    Past Medical History     Medical History and Problem List   Past Medical History:   Diagnosis Date    Benign essential hypertension     Gastroesophageal reflux disease without esophagitis     Hyperlipidemia LDL goal <100     Osteoporosis     T2DM (type 2 diabetes mellitus) (H)        Medications   alendronate (FOSAMAX) 70 MG tablet  aspirin 81 MG EC tablet  calcium carbonate-vitamin D (OSCAL) 500-5 MG-MCG tablet  EPINEPHrine (ANY BX GENERIC EQUIV) 0.3 MG/0.3ML injection 2-pack  FARXIGA 10 MG TABS tablet  gabapentin (NEURONTIN) 600 MG tablet  lisinopril (ZESTRIL) 20 MG tablet  metFORMIN (GLUCOPHAGE) 1000 MG tablet  multivitamin w/minerals (MULTI-VITAMIN) tablet  omeprazole (PRILOSEC) 20 MG DR capsule  Semaglutide, 2 MG/DOSE, (OZEMPIC, 2 MG/DOSE,) 8 MG/3ML pen  simvastatin (ZOCOR) 40 MG tablet        Surgical History   Past Surgical History:   Procedure  Laterality Date     SECTION      CHOLECYSTECTOMY         Physical Exam     No data found.    Physical Exam  General: Patient is alert and cooperative.  HENT:  Normal nose, oropharynx. Moist oral mucosa.  Eyes: EOMI. Normal conjunctiva.  Neck:  Normal range of motion and appearance.   Cardiovascular:  tachycardic rate, regular rhythm and normal heart sounds.   Pulmonary/Chest:  Effort normal.  Abdominal: Soft. No distension or tenderness.     Musculoskeletal: Normal range of motion.   Neurological: oriented, normal strength, sensation, and coordination.   Skin: Warm and dry. No rash or bruising.   Psychiatric: Normal mood and affect. Normal behavior and judgement.      Diagnostics     Lab Results   Labs Ordered and Resulted from Time of ED Arrival to Time of ED Departure   BASIC METABOLIC PANEL - Abnormal       Result Value    Sodium 139      Potassium 4.3      Chloride 101      Carbon Dioxide (CO2) 19 (*)     Anion Gap 19 (*)     Urea Nitrogen 38.1 (*)     Creatinine 0.80      GFR Estimate 79      Calcium 10.7 (*)     Glucose 189 (*)    LACTIC ACID WHOLE BLOOD WITH 1X REPEAT IN 2 HR WHEN >2 - Abnormal    Lactic Acid, Initial 3.0 (*)    BLOOD GAS VENOUS - Abnormal    pH Venous 7.37      pCO2 Venous 39 (*)     pO2 Venous 36      Bicarbonate Venous 23      Base Excess/Deficit Venous -2.4      FIO2 0      Oxyhemoglobin Venous 66 (*)     O2 Sat, Venous 66.8 (*)    ROUTINE UA WITH MICROSCOPIC REFLEX TO CULTURE - Abnormal    Color Urine Light Yellow      Appearance Urine Clear      Glucose Urine >=1000 (*)     Bilirubin Urine Negative      Ketones Urine Negative      Specific Gravity Urine 1.022      Blood Urine Negative      pH Urine 5.5      Protein Albumin Urine Negative      Urobilinogen Urine Normal      Nitrite Urine Negative      Leukocyte Esterase Urine Small (*)     Bacteria Urine Few (*)     Mucus Urine Present (*)     RBC Urine <1      WBC Urine 11 (*)     Squamous Epithelials Urine <1      Hyaline Casts  Urine 3 (*)    LACTIC ACID WHOLE BLOOD - Abnormal    Lactic Acid 2.4 (*)    INFLUENZA A/B, RSV, & SARS-COV2 PCR - Normal    Influenza A PCR Negative      Influenza B PCR Negative      RSV PCR Negative      SARS CoV2 PCR Negative     D DIMER QUANTITATIVE - Normal    D-Dimer Quantitative 0.28     TROPONIN T, HIGH SENSITIVITY - Normal    Troponin T, High Sensitivity 8     PROCALCITONIN - Normal    Procalcitonin 0.05     CRP INFLAMMATION - Normal    CRP Inflammation <3.00     HEPATIC FUNCTION PANEL - Normal    Protein Total 7.7      Albumin 4.7      Bilirubin Total 1.1      Alkaline Phosphatase 47      AST 25      ALT 49      Bilirubin Direct 0.26     LIPASE - Normal    Lipase 46     CBC WITH PLATELETS AND DIFFERENTIAL    WBC Count 4.9      RBC Count 4.89      Hemoglobin 15.0      Hematocrit 45.2      MCV 92      MCH 30.7      MCHC 33.2      RDW 12.5      Platelet Count 163      % Neutrophils 54      % Lymphocytes 31      % Monocytes 12      % Eosinophils 2      % Basophils 1      % Immature Granulocytes 0      NRBCs per 100 WBC 0      Absolute Neutrophils 2.6      Absolute Lymphocytes 1.5      Absolute Monocytes 0.6      Absolute Eosinophils 0.1      Absolute Basophils 0.0      Absolute Immature Granulocytes 0.0      Absolute NRBCs 0.0         Imaging   CT Chest/Abdomen/Pelvis w Contrast   Final Result   IMPRESSION:   1.  No acute intrathoracic or intra-abdominal process identified.    2.  Mild hepatic steatosis   3.  Tortuosity of the adnexal vessels bilaterally, with prominent bilateral gonadal veins, left greater than right, this may reflect underlying pelvic congestion syndrome, similar findings were present on prior CT from 2/11/2024.      XR Chest Port 1 View   Final Result   IMPRESSION: Negative chest. Prominent right-sided chondral calcifications noted.          EKG   ECG results from 07/02/24   EKG 12 lead     Value    Systolic Blood Pressure     Diastolic Blood Pressure     Ventricular Rate 121    Atrial Rate  121    NJ Interval 134    QRS Duration 84        QTc 431    P Axis 46    R AXIS 6    T Axis 32    Interpretation ECG      Sinus tachycardia  Possible Anterior infarct , age undetermined  Abnormal ECG  When compared with ECG of 11-FEB-2024 17:49,  No significant change was found  Unconfirmed report - interpretation of this ECG is computer generated - see medical record for final interpretation  Confirmed by - EMERGENCY ROOM, PHYSICIAN ([id number]),  [place] ([id number]) on 7/3/2024 7:33:11 AM         Independent Interpretation   None    ED Course      Medications Administered   Medications   sodium chloride 0.9% BOLUS 1,000 mL (0 mLs Intravenous Stopped 7/2/24 2315)   iopamidol (ISOVUE-370) solution 500 mL (72 mLs Intravenous $Given 7/2/24 2215)   sodium chloride (PF) 0.9% PF flush 100 mL (58 mLs Intravenous $Given 7/2/24 2215)   sodium chloride 0.9% BOLUS 1,000 mL (1,000 mLs Intravenous $New Bag 7/2/24 2346)   ceFEPIme (MAXIPIME) 1 g vial to attach to  mL bag for ADULTS or NS 50 mL bag for PEDS (1 g Intravenous $New Bag 7/2/24 2346)       Procedures   Procedures     Discussion of Management   None    ED Course   I reviewed the patient's medical record.    Optional/Additional Documentation  None    Medical Decision Making / Diagnosis     CMS Diagnoses: None    MIPS       None    MDM   Charlette Rodgers is a 68 year old female arrives for evaluation of a 3-day illness with nausea initially followed by the development of some shortness of breath and tachycardia.  Daughter also describes noting blood pressure as low as 92/62 at home and a heart rate of 132.  Workup thus far has included a normal temperature and blood pressure, EKG depicting a sinus tachycardia.  Labs include a lactic acid of 3.0.  She has a normal D-dimer, 0.28.  White blood cell count is 4.9.  BMP and troponin are pending.  Blood cultures have been sent and are pending as well.  Chest x-ray shows no significant infiltrate or other explanation  for her symptoms.  I have ordered a CT chest abdomen pelvis with contrast for further evaluation.  She will likely require admission.  Care is being signed out to my colleague Dr. Garcia who will follow-up on imaging remainder of lab tests, reassess patient, and determine appropriate final disposition.    Disposition   pending    Diagnosis     ICD-10-CM    1. Sepsis without acute organ dysfunction, due to unspecified organism (H)  A41.9       2. Tachycardia  R00.0 lisinopril (ZESTRIL) 20 MG tablet           Discharge Medications   Discharge Medication List as of 7/3/2024 11:17 AM            MD Carlee Carias Brian A, MD  07/19/24 1734

## 2024-07-03 NOTE — PLAN OF CARE
Patient's After Visit Summary was reviewed with patient and/or daughter.   Patient verbalized understanding of After Visit Summary, recommended follow up and was given an opportunity to ask questions.   Discharge medications sent home with patient/family: Not applicable   Discharged with daughter      Removed 2 PIVs.

## 2024-07-03 NOTE — ED TRIAGE NOTES
Pt arrives ambulatory in triage. Pt began feeling sick (nauseated) Sunday night, chills. Threw up today, felt short of breath. Pt's daughter reports that pt had BP of 92/64, , palpitations, and had blue lips at time of low blood pressure. Typically hypertensive and has not taken her lisinopril yet tonight. A&Ox4.

## 2024-07-03 NOTE — H&P
History and Physical     Charlette Rodgers MRN# [id number]   YOB: 1955 Age: 68 year old      Date of Admission:  7/2/2024    Primary care provider: Brian Payne          Assessment and Plan:     Summary of Stay: Charlette Rodgers is a 68 year old female with a history of htn/hlp/T2dm complicated by peripheral neuropathy, GERD, osteoporosis admitted on 7/2/2024 with admitted with a constellation of symptoms of unclear etiology     She began feeling unwell 2 days prior to presentation-she noted some nausea with a headache and sense of being hot and cold.  She took her temp and she had a LGF 99.6 range.  Then yesterday the headache resolved vut the nausea persisted and she didn't feel like taking in much po at all.  No vomiting.  Then on the am of presentation noticed that she was having palpitations and SOB with mild exertion,.  Nausea worsened and she actually vomited.  Then her dtr noticed that her lips turned blue/purple, and her veins seemed prominently blue-Charlette noticed that she did feel a little short of breath with it.  They took her BP with their monitor and ranged in the /60's with HRs in the 130's.  They called the nurse line and were instructed to come into the ER.     She denies any abdominal pain or diarrhea.  No sick contacts, no recent travel although did return from a trip to [place] and [place] in April     She denies any change in her chronic left sided neck stiffness, no NC/EA/ST/cough, no dysuria/urgency/frequency, no  skin rashes, no unusual bone pain     ER BPs in the 110-130's/70's, but HRs were elevated into the mid 120's-> 110's after 2L NS  Respiratory status was stable and she was afebrile     CMP with CO2 19 with AG 19 and lactate 3.0->2.4 after IVF  Ca elevated at 10.7  Trop 8, EKG ST with poor R-wave progression   CBC with diff wnl Procal 0.05  UA with glucosuria, small LE, 11 wbcs, few bacteria, 3 hyaline casts  Covid/flu/rsv negative    CT C/A/P negative for acute  pathology     I am asked to admit for monitoring purposes in the setting of persistent tachycardia    Problem List:   Viral syndrome vs cryptic bacterial or alternative infectious process vs other   The only localizing symptom she has is that of nausea, although no diarrhea or abdominal pain.  She did have some mild epigastric abdominal pain on exam   CT chest/abd/pelvis unrevealing.  Has good ROM of the neck without pain to palpation   Covid/flu/rsv negative  She was given a dose of cefepime in the ER   -Will hold off on additional abx at this time given lack of true infectious process, also with normal wbc and low procal  -Low threshold to start should high fevers develop    Transient low BPs (although MAPs ok)  Persistent sinus tachycardia   She was hospitalized here in February of this year with septic shock related to was was thought to be food poisoning with concern for vibrio cholera.  She was mildly tachy at that time and HR were in the 80's at discharge.  She denies using anything like pseudophed, is not on albuterol.  No recent medication changes  ? Anxiety in setting of being in the hospital  She's already received 2 L of fluid and feels like she is already swelling up  -monitor on tele    ??transient cyanosis   No idea what this represents.  It's apparently happened in the past although not this severely.  I thought about methylene blue but I don't think it would resolve itself so quickly  Maybe an unusual type of raynaud's??    Elevated lactic acid with AGMA   I do not feel this is consistent with sepsis, I think it has more to due with her dehydration from nausea and poor po, tachycardia and metformin use  -hold metformin  -no more fluids  -swati in am    Htn  Hlp  Pta is on asa 81 mg every day, lisinopril 20 mg every day, simvastatin   -ok to resume asa  -hold statin for cost purposes-resume at discharge   -hold ACEI given lowish BPs at home       T2dm complicated by peripheral neuropathy   Pta is on  metformin 1g bid, semaglutide 2 mg q week, dapagliflozin (Farxiga)  Will hold all  Monitor bid and notify if > 300     Osteoporosis   Hold while on obs    DVT Prophylaxis: Pneumatic Compression Devices  Code Status: Full Code  Functional Status: independent  [place]:not indicated  Access:  PIV            Time spent 75 minutes reviewing epic including notes/labs/prior hx, current medications.  In addition to interviewing and examining the patient, updated patient and family regarding plan of care          Chief Complaint:     Multiple symptoms        History of Present Illness:   Charlette Rodgers is a 68 year old female with a history of htn/hlp/T2dm complicated by peripheral neuropathy, GERD, osteoporosis admitted on 7/2/2024 with admitted with a constellation of symptoms of unclear etiology     She began feeling unwell 2 days prior to presentation-she noted some nausea with a headache and sense of being hot and cold.  She took her temp and she had a LGF 99.6 range.  Then yesterday the headache resolved vut the nausea persisted and she didn't feel like taking in much po at all.  No vomiting.  Then on the am of presentation noticed that she was having palpitations and SOB with mild exertion,.  Nausea worsened and she actually vomited.  Then her dtr noticed that her lips turned blue/purple, and her veins seemed prominently blue-Charlette noticed that she did feel a little short of breath with it.  They took her BP with their monitor and ranged in the /60's with HRs in the 130's.  They called the nurse line and were instructed to come into the ER.     She denies any abdominal pain or diarrhea.  No sick contacts, no recent travel although did return from a trip to [place] and [place] in April     She denies any change in her chronic left sided neck stiffness, no NC/EA/ST/cough, no dysuria/urgency/frequency, no  skin rashes, no unusual bone pain     ER BPs in the 110-130's/70's, but HRs were elevated into the mid 120's->  110's after 2L NS  Respiratory status was stable and she was afebrile     CMP with CO2 19 with AG 19 and lactate 3.0->2.4 after IVF  Ca elevated at 10.7  Trop 8, EKG ST with poor R-wave progression   CBC with diff wnl Procal 0.05  UA with glucosuria, small LE, 11 wbcs, few bacteria, 3 hyaline casts  Covid/flu/rsv negative    CT C/A/P negative for acute pathology     I am asked to admit for monitoring purposes in the setting of persistent tachycardia      The history is obtained in discussion with the ER provider Tee Barahona MD, the patient with fair reliability, and the dtr with excellent reliability     Epic and Care everywhere were extensively reviewed        Past Medical History:     Past Medical History:   Diagnosis Date    Benign essential hypertension     Gastroesophageal reflux disease without esophagitis     Hyperlipidemia LDL goal <100     Osteoporosis     T2DM (type 2 diabetes mellitus) (H)              Past Surgical History:     Past Surgical History:   Procedure Laterality Date     SECTION      CHOLECYSTECTOMY               Social History:     Social History     Tobacco Use    Smoking status: Never    Smokeless tobacco: Not on file   Substance Use Topics    Alcohol use: Never             Family History:     Family History   Problem Relation Age of Onset    Cataracts Mother     Cataracts Brother     Diabetes Brother     Hypertension Brother             Allergies:     Allergies   Allergen Reactions    Dulaglutide Rash     LOCAL REACTION TO INJECT SITE    Penicillins Rash     Tolerated Zosyn on 2024             Medications:     Prior to Admission medications    Medication Sig Last Dose Taking? Auth Provider Long Term End Date   alendronate (FOSAMAX) 70 MG tablet Take 70 mg by mouth every 7 days Saturday Past Week at sat Yes Unknown, Entered By History Yes    aspirin 81 MG EC tablet Take 81 mg by mouth at bedtime 2024 at hs Yes Unknown, Entered By History     calcium carbonate-vitamin D  (OSCAL) 500-5 MG-MCG tablet Take 1 tablet by mouth daily 7/2/2024 at am Yes Unknown, Entered By History     EPINEPHrine (ANY BX GENERIC EQUIV) 0.3 MG/0.3ML injection 2-pack Inject 0.3 mLs (0.3 mg) into the muscle as needed for anaphylaxis May repeat one time in 5-15 minutes if response to initial dose is inadequate. prn at prn Yes Vanessa Cunningham MD     FARXIGA 10 MG TABS tablet Take 10 mg by mouth daily 7/2/2024 at am Yes Unknown, Entered By History     gabapentin (NEURONTIN) 600 MG tablet Take 600 mg by mouth 3 times daily 7/2/2024 at am Yes Unknown, Entered By History Yes    lisinopril (ZESTRIL) 20 MG tablet Take 20 mg by mouth at bedtime 7/1/2024 at hs Yes Unknown, Entered By History Yes    metFORMIN (GLUCOPHAGE) 1000 MG tablet Take 1 tablet by mouth 2 times daily 7/2/2024 at am Yes Unknown, Entered By History Yes    multivitamin w/minerals (MULTI-VITAMIN) tablet Take 1 tablet by mouth daily 7/2/2024 at am Yes Unknown, Entered By History     omeprazole (PRILOSEC) 20 MG DR capsule Take 20 mg by mouth daily 7/2/2024 at am Yes Unknown, Entered By History     Semaglutide, 2 MG/DOSE, (OZEMPIC, 2 MG/DOSE,) 8 MG/3ML pen Inject 2 mg Subcutaneous every 7 days Past Week at sun Yes Unknown, Entered By History     simvastatin (ZOCOR) 40 MG tablet Take 40 mg by mouth at bedtime 7/1/2024 at hs Yes Unknown, Entered By History Yes                  Review of Systems:     A Comprehensive greater than 10 system review of systems was carried out.  Pertinent positives and negatives are noted above.  Otherwise negative for contributory information.           Physical Exam:   Blood pressure 115/74, pulse 113, temperature 98  F (36.7  C), temperature source Temporal, resp. rate 24, SpO2 98%.  Exam:    General:  Pleasant nad looks stated age  HEENT:  Head nc/at sclera clear PERRL   Neck is supple with good ROM  Lungs: cta b nl effort   CV:  RRR - tachy no m/r/g no le edema  Abd:  S/nt, mild tenderness to epigastric region  no  r/g  Neuro:  Cn 2-12 grossly intact and flores  Alert and oriented affect appropriate   Skin:  W/d no c/c               Data:     Results for orders placed or performed during the hospital encounter of 07/02/24   XR Chest Port 1 View     Status: None    Narrative    EXAM: XR CHEST PORT 1 VIEW  LOCATION: [facility]  DATE: 7/2/2024    INDICATION: Heart size and vascularity are normal.  COMPARISON: CT abdomen pelvis 2/11/2024      Impression    IMPRESSION: Negative chest. Prominent right-sided chondral calcifications noted.   CT Chest/Abdomen/Pelvis w Contrast     Status: None    Narrative    EXAM: CT CHEST/ABDOMEN/PELVIS W CONTRAST  LOCATION: [facility]  DATE: 7/2/2024    INDICATION: tachycardic, chest pain, nausea, borderline hypoxia  COMPARISON: 2/11/2024.  TECHNIQUE: CT scan of the chest, abdomen, and pelvis was performed following injection of IV contrast. Multiplanar reformats were obtained. Dose reduction techniques were used.   CONTRAST: 72ml Isovue 370    FINDINGS:   LUNGS AND PLEURA: Lungs are clear. No pleural effusions.    MEDIASTINUM/AXILLAE: No lymphadenopathy. No thoracic aortic aneurysms.    CORONARY ARTERY CALCIFICATION: Moderate.    HEPATOBILIARY: No significant mass or bile duct dilatation. Mild hepatic steatosis. No calcified gallstones.  The gallbladder surgically absent.    PANCREAS: No significant mass, duct dilatation, or inflammatory change.    SPLEEN: Normal.    ADRENAL GLANDS: No significant nodules.    KIDNEYS/BLADDER: No significant mass, stone, or hydronephrosis.    BOWEL: No obstruction or inflammatory change.    LYMPH NODES: No lymphadenopathy.    VASCULATURE: No abdominal aortic aneurysm. Tortuosity of the adnexal vessels are noted bilaterally, slightly worse on the left than the right with prominence of the gonadal veins bilaterally.    PELVIC ORGANS: No pelvic masses.    MUSCULOSKELETAL: Multilevel discogenic and posterior facet degenerative  changes of the thoracolumbar spine are noted most pronounced at the  T12 L1 level where there is a large posterior disc osteophyte complex. Moderate degenerative osteoarthritic   changes of the left sternoclavicular joint are noted.      Impression    IMPRESSION:  1.  No acute intrathoracic or intra-abdominal process identified.   2.  Mild hepatic steatosis  3.  Tortuosity of the adnexal vessels bilaterally, with prominent bilateral gonadal veins, left greater than right, this may reflect underlying pelvic congestion syndrome, similar findings were present on prior CT from 2/11/2024.   Symptomatic Influenza A/B, RSV, & SARS-CoV2 PCR (COVID-19) Nose     Status: Normal    Specimen: Nose; Swab   Result Value Ref Range    Influenza A PCR Negative Negative    Influenza B PCR Negative Negative    RSV PCR Negative Negative    SARS CoV2 PCR Negative Negative    Narrative    Testing was performed using the Xpert Xpress CoV2/Flu/RSV Assay on the [employer] GeneXpert Instrument. This test should be ordered for the detection of SARS-CoV-2, influenza, and RSV viruses in individuals who meet clinical and/or epidemiological criteria. Test performance is unknown in asymptomatic patients. This test is for in vitro diagnostic use under the FDA EUA for laboratories certified under CLIA to perform high or moderate complexity testing. This test has not been FDA cleared or approved. A negative result does not rule out the presence of PCR inhibitors in the specimen or target RNA in concentration below the limit of detection for the assay. If only one viral target is positive but coinfection with multiple targets is suspected, the sample should be re-tested with another FDA cleared, approved, or authorized test, if coinfection would change clinical management. This test was validated by the [facility] [employer]. These laboratories are certified under the Clinical Laboratory Improvement Amendments of 1988 (CLIA-88) as qualified to  perform high complexity laboratory testing.   Basic metabolic panel     Status: Abnormal   Result Value Ref Range    Sodium 139 135 - 145 mmol/L    Potassium 4.3 3.4 - 5.3 mmol/L    Chloride 101 98 - 107 mmol/L    Carbon Dioxide (CO2) 19 (L) 22 - 29 mmol/L    Anion Gap 19 (H) 7 - 15 mmol/L    Urea Nitrogen 38.1 (H) 8.0 - 23.0 mg/dL    Creatinine 0.80 0.51 - 0.95 mg/dL    GFR Estimate 79 >60 mL/min/1.73m2    Calcium 10.7 (H) 8.8 - 10.2 mg/dL    Glucose 189 (H) 70 - 99 mg/dL   Lactic acid whole blood with 1x repeat in 2 hr when >2     Status: Abnormal   Result Value Ref Range    Lactic Acid, Initial 3.0 (H) 0.7 - 2.0 mmol/L   Blood gas venous     Status: Abnormal   Result Value Ref Range    pH Venous 7.37 7.32 - 7.43    pCO2 Venous 39 (L) 40 - 50 mm Hg    pO2 Venous 36 25 - 47 mm Hg    Bicarbonate Venous 23 21 - 28 mmol/L    Base Excess/Deficit Venous -2.4 -3.0 - 3.0 mmol/L    FIO2 0     Oxyhemoglobin Venous 66 (L) 70 - 75 %    O2 Sat, Venous 66.8 (L) 70.0 - 75.0 %    Narrative    In healthy individuals, oxyhemoglobin (O2Hb) and oxygen saturation (SO2) are approximately equal. In the presence of dyshemoglobins, oxyhemoglobin can be considerably lower than oxygen saturation.   D dimer quantitative     Status: Normal   Result Value Ref Range    D-Dimer Quantitative 0.28 0.00 - 0.50 ug/mL FEU    Narrative    This D-dimer assay is intended for use in conjunction with a clinical pretest probability assessment model to exclude pulmonary embolism (PE) and deep venous thrombosis (DVT) in outpatients suspected of PE or DVT. The cut-off value is 0.50 ug/mL FEU.    For patients 50 years of age or older, the application of age-adjusted cut-off values for D-Dimer may increase the specificity without significant effect on sensitivity. The literature suggested calculation age adjusted cut-off in ug/L = age in years x 10 ug/L. The results in this laboratory are reported as ug/mL rather than ug/L. The calculation for age adjusted cut  off in ug/mL= age in years x 0.01 ug/mL. For example, the cut off for a 76 year old male is 75 x 0.01 ug/mL = 0.76 ug/mL (760 ug/L).    M Shira et al. Age adjusted D-dimer cut-off levels to rule out pulmonary embolism: The ADJUST-PE Study. OCTAVIO 2014;311:2555-0285.; HJ Curtis et al. Diagnostic accuracy of conventional or age adjusted D-dimer cutoff values in older patients with suspected venous thromboembolism. Systemic review and meta-analysis. BMJ 2013:346:f2492.   Troponin T, High Sensitivity     Status: Normal   Result Value Ref Range    Troponin T, High Sensitivity 8 <=14 ng/L   CBC with platelets and differential     Status: None   Result Value Ref Range    WBC Count 4.9 4.0 - 11.0 10e3/uL    RBC Count 4.89 3.80 - 5.20 10e6/uL    Hemoglobin 15.0 11.7 - 15.7 g/dL    Hematocrit 45.2 35.0 - 47.0 %    MCV 92 78 - 100 fL    MCH 30.7 26.5 - 33.0 pg    MCHC 33.2 31.5 - 36.5 g/dL    RDW 12.5 10.0 - 15.0 %    Platelet Count 163 150 - 450 10e3/uL    % Neutrophils 54 %    % Lymphocytes 31 %    % Monocytes 12 %    % Eosinophils 2 %    % Basophils 1 %    % Immature Granulocytes 0 %    NRBCs per 100 WBC 0 <1 /100    Absolute Neutrophils 2.6 1.6 - 8.3 10e3/uL    Absolute Lymphocytes 1.5 0.8 - 5.3 10e3/uL    Absolute Monocytes 0.6 0.0 - 1.3 10e3/uL    Absolute Eosinophils 0.1 0.0 - 0.7 10e3/uL    Absolute Basophils 0.0 0.0 - 0.2 10e3/uL    Absolute Immature Granulocytes 0.0 <=0.4 10e3/uL    Absolute NRBCs 0.0 10e3/uL   UA with Microscopic reflex to Culture     Status: Abnormal    Specimen: Urine, NOS   Result Value Ref Range    Color Urine Light Yellow Colorless, Straw, Light Yellow, Yellow    Appearance Urine Clear Clear    Glucose Urine >=1000 (A) Negative mg/dL    Bilirubin Urine Negative Negative    Ketones Urine Negative Negative mg/dL    Specific Gravity Urine 1.022 1.003 - 1.035    Blood Urine Negative Negative    pH Urine 5.5 5.0 - 7.0    Protein Albumin Urine Negative Negative mg/dL    Urobilinogen Urine Normal  Normal, 2.0 mg/dL    Nitrite Urine Negative Negative    Leukocyte Esterase Urine Small (A) Negative    Bacteria Urine Few (A) None Seen /HPF    Mucus Urine Present (A) None Seen /LPF    RBC Urine <1 <=2 /HPF    WBC Urine 11 (H) <=5 /HPF    Squamous Epithelials Urine <1 <=1 /HPF    Hyaline Casts Urine 3 (H) <=2 /LPF    Narrative    Urine Culture ordered based on laboratory criteria   Lactic acid whole blood     Status: Abnormal   Result Value Ref Range    Lactic Acid 2.4 (H) 0.7 - 2.0 mmol/L   Procalcitonin     Status: Normal   Result Value Ref Range    Procalcitonin 0.05 <0.50 ng/mL   [facility] Draw *Canceled*     Status: None ()    Narrative    The following orders were created for panel order [facility] Draw.  Procedure                               Abnormality         Status                     ---------                               -----------         ------                       Please view results for these tests on the individual orders.   [facility] Draw     Status: None (In process)    Narrative    The following orders were created for panel order [facility] Draw.  Procedure                               Abnormality         Status                     ---------                               -----------         ------                     Extra Blood Culture Bottle[[id number]]                       In process                   Please view results for these tests on the individual orders.   [facility] Draw     Status: None (In process)    Narrative    The following orders were created for panel order [facility] Draw.  Procedure                               Abnormality         Status                     ---------                               -----------         ------                     Extra Blood Culture Bottle[[id number]]                       In process                   Please view results for these tests on the individual orders.   CRP inflammation     Status: Normal   Result Value Ref Range    CRP Inflammation <3.00  <5.00 mg/L   Hepatic panel     Status: Normal   Result Value Ref Range    Protein Total 7.7 6.4 - 8.3 g/dL    Albumin 4.7 3.5 - 5.2 g/dL    Bilirubin Total 1.1 <=1.2 mg/dL    Alkaline Phosphatase 47 40 - 150 U/L    AST 25 0 - 45 U/L    ALT 49 0 - 50 U/L    Bilirubin Direct 0.26 0.00 - 0.30 mg/dL   [facility] Draw     Status: None (In process)    Narrative    The following orders were created for panel order [facility] Draw.  Procedure                               Abnormality         Status                     ---------                               -----------         ------                     Extra Green Top (Lithium...[[id number]]                      In process                   Please view results for these tests on the individual orders.   EKG 12 lead     Status: None (Preliminary result)   Result Value Ref Range    Systolic Blood Pressure  mmHg    Diastolic Blood Pressure  mmHg    Ventricular Rate 121 BPM    Atrial Rate 121 BPM    LA Interval 134 ms    QRS Duration 84 ms     ms    QTc 431 ms    P Axis 46 degrees    R AXIS 6 degrees    T Axis 32 degrees    Interpretation ECG       Sinus tachycardia  Possible Anterior infarct , age undetermined  Abnormal ECG  When compared with ECG of 11-FEB-2024 17:49,  No significant change was found     CBC with platelets differential     Status: None    Narrative    The following orders were created for panel order CBC with platelets differential.  Procedure                               Abnormality         Status                     ---------                               -----------         ------                     CBC with platelets and d...[[id number]]                      Final result                 Please view results for these tests on the individual orders.

## 2024-07-04 LAB — BACTERIA UR CULT: NORMAL

## 2024-10-19 ENCOUNTER — APPOINTMENT (OUTPATIENT)
Dept: CT IMAGING | Facility: CLINIC | Age: 69
End: 2024-10-19
Payer: MEDICARE

## 2024-10-19 ENCOUNTER — APPOINTMENT (OUTPATIENT)
Dept: MRI IMAGING | Facility: CLINIC | Age: 69
End: 2024-10-19
Payer: MEDICARE

## 2024-10-19 ENCOUNTER — HOSPITAL ENCOUNTER (EMERGENCY)
Facility: CLINIC | Age: 69
Discharge: HOME | End: 2024-10-19
Attending: EMERGENCY MEDICINE | Admitting: EMERGENCY MEDICINE
Payer: MEDICARE

## 2024-10-19 ENCOUNTER — APPOINTMENT (OUTPATIENT)
Dept: GENERAL RADIOLOGY | Facility: CLINIC | Age: 69
End: 2024-10-19
Attending: EMERGENCY MEDICINE
Payer: MEDICARE

## 2024-10-19 VITALS
OXYGEN SATURATION: 96 % | HEIGHT: 65 IN | HEART RATE: 79 BPM | BODY MASS INDEX: 23.99 KG/M2 | DIASTOLIC BLOOD PRESSURE: 86 MMHG | TEMPERATURE: 98.6 F | WEIGHT: 143.96 LBS | RESPIRATION RATE: 20 BRPM | SYSTOLIC BLOOD PRESSURE: 126 MMHG

## 2024-10-19 DIAGNOSIS — R51.9 HEADACHE: ICD-10-CM

## 2024-10-19 DIAGNOSIS — I10 HYPERTENSION: Primary | ICD-10-CM

## 2024-10-19 DIAGNOSIS — R42 DIZZINESS: ICD-10-CM

## 2024-10-19 LAB
ALBUMIN SERPL BCG-MCNC: 4.9 G/DL (ref 3.5–5.2)
ALBUMIN UR-MCNC: 70 MG/DL
ALP SERPL-CCNC: 52 U/L (ref 40–150)
ALT SERPL W P-5'-P-CCNC: 56 U/L (ref 0–50)
ANION GAP SERPL CALCULATED.3IONS-SCNC: 17 MMOL/L (ref 7–15)
APPEARANCE UR: CLEAR
AST SERPL W P-5'-P-CCNC: 41 U/L (ref 0–45)
BASOPHILS # BLD AUTO: 0 10E3/UL (ref 0–0.2)
BASOPHILS NFR BLD AUTO: 1 %
BILIRUB SERPL-MCNC: 0.6 MG/DL
BILIRUB UR QL STRIP: NEGATIVE
BUN SERPL-MCNC: 12.6 MG/DL (ref 8–23)
CALCIUM SERPL-MCNC: 10.4 MG/DL (ref 8.8–10.4)
CHLORIDE SERPL-SCNC: 102 MMOL/L (ref 98–107)
COLOR UR AUTO: ABNORMAL
CREAT SERPL-MCNC: 0.5 MG/DL (ref 0.51–0.95)
EGFRCR SERPLBLD CKD-EPI 2021: >90 ML/MIN/1.73M2
EOSINOPHIL # BLD AUTO: 0.1 10E3/UL (ref 0–0.7)
EOSINOPHIL NFR BLD AUTO: 2 %
ERYTHROCYTE [DISTWIDTH] IN BLOOD BY AUTOMATED COUNT: 12 % (ref 10–15)
GLUCOSE SERPL-MCNC: 130 MG/DL (ref 70–99)
GLUCOSE UR STRIP-MCNC: NEGATIVE MG/DL
HCO3 SERPL-SCNC: 22 MMOL/L (ref 22–29)
HCT VFR BLD AUTO: 42.4 % (ref 35–47)
HGB BLD-MCNC: 14.5 G/DL (ref 11.7–15.7)
HGB UR QL STRIP: NEGATIVE
HOLD SPECIMEN: NORMAL
HOLD SPECIMEN: NORMAL
IMM GRANULOCYTES # BLD: 0 10E3/UL
IMM GRANULOCYTES NFR BLD: 0 %
KETONES UR STRIP-MCNC: NEGATIVE MG/DL
LEUKOCYTE ESTERASE UR QL STRIP: NEGATIVE
LIPASE SERPL-CCNC: 58 U/L (ref 13–60)
LYMPHOCYTES # BLD AUTO: 1.9 10E3/UL (ref 0.8–5.3)
LYMPHOCYTES NFR BLD AUTO: 38 %
MCH RBC QN AUTO: 30.5 PG (ref 26.5–33)
MCHC RBC AUTO-ENTMCNC: 34.2 G/DL (ref 31.5–36.5)
MCV RBC AUTO: 89 FL (ref 78–100)
MONOCYTES # BLD AUTO: 0.4 10E3/UL (ref 0–1.3)
MONOCYTES NFR BLD AUTO: 8 %
NEUTROPHILS # BLD AUTO: 2.5 10E3/UL (ref 1.6–8.3)
NEUTROPHILS NFR BLD AUTO: 51 %
NITRATE UR QL: NEGATIVE
NRBC # BLD AUTO: 0 10E3/UL
NRBC BLD AUTO-RTO: 0 /100
NT-PROBNP SERPL-MCNC: 51 PG/ML (ref 0–900)
PH UR STRIP: 7 [PH] (ref 5–7)
PLATELET # BLD AUTO: 181 10E3/UL (ref 150–450)
POTASSIUM SERPL-SCNC: 3.6 MMOL/L (ref 3.4–5.3)
PROT SERPL-MCNC: 7.9 G/DL (ref 6.4–8.3)
RBC # BLD AUTO: 4.76 10E6/UL (ref 3.8–5.2)
RBC URINE: <1 /HPF
SODIUM SERPL-SCNC: 141 MMOL/L (ref 135–145)
SP GR UR STRIP: 1.01 (ref 1–1.03)
TROPONIN T SERPL HS-MCNC: <6 NG/L
UROBILINOGEN UR STRIP-MCNC: NORMAL MG/DL
WBC # BLD AUTO: 4.8 10E3/UL (ref 4–11)
WBC URINE: <1 /HPF

## 2024-10-19 PROCEDURE — 250N000011 HC RX IP 250 OP 636

## 2024-10-19 PROCEDURE — 255N000002 HC RX 255 OP 636

## 2024-10-19 PROCEDURE — 83690 ASSAY OF LIPASE: CPT | Performed by: EMERGENCY MEDICINE

## 2024-10-19 PROCEDURE — G1010 CDSM STANSON: HCPCS

## 2024-10-19 PROCEDURE — 70496 CT ANGIOGRAPHY HEAD: CPT | Mod: MF

## 2024-10-19 PROCEDURE — 36415 COLL VENOUS BLD VENIPUNCTURE: CPT | Performed by: EMERGENCY MEDICINE

## 2024-10-19 PROCEDURE — 250N000013 HC RX MED GY IP 250 OP 250 PS 637

## 2024-10-19 PROCEDURE — 93005 ELECTROCARDIOGRAM TRACING: CPT

## 2024-10-19 PROCEDURE — 80053 COMPREHEN METABOLIC PANEL: CPT | Performed by: EMERGENCY MEDICINE

## 2024-10-19 PROCEDURE — 250N000009 HC RX 250

## 2024-10-19 PROCEDURE — A9585 GADOBUTROL INJECTION: HCPCS

## 2024-10-19 PROCEDURE — 84484 ASSAY OF TROPONIN QUANT: CPT | Performed by: EMERGENCY MEDICINE

## 2024-10-19 PROCEDURE — 96374 THER/PROPH/DIAG INJ IV PUSH: CPT | Mod: 59

## 2024-10-19 PROCEDURE — 85004 AUTOMATED DIFF WBC COUNT: CPT | Performed by: EMERGENCY MEDICINE

## 2024-10-19 PROCEDURE — 81001 URINALYSIS AUTO W/SCOPE: CPT | Performed by: EMERGENCY MEDICINE

## 2024-10-19 PROCEDURE — 70553 MRI BRAIN STEM W/O & W/DYE: CPT | Mod: MF

## 2024-10-19 PROCEDURE — 71046 X-RAY EXAM CHEST 2 VIEWS: CPT

## 2024-10-19 PROCEDURE — 83880 ASSAY OF NATRIURETIC PEPTIDE: CPT | Performed by: EMERGENCY MEDICINE

## 2024-10-19 PROCEDURE — 99285 EMERGENCY DEPT VISIT HI MDM: CPT | Mod: 25

## 2024-10-19 RX ORDER — GADOBUTROL 604.72 MG/ML
0.1 INJECTION INTRAVENOUS ONCE
Status: COMPLETED | OUTPATIENT
Start: 2024-10-19 | End: 2024-10-19

## 2024-10-19 RX ORDER — ONDANSETRON 2 MG/ML
4 INJECTION INTRAMUSCULAR; INTRAVENOUS EVERY 30 MIN PRN
Status: DISCONTINUED | OUTPATIENT
Start: 2024-10-19 | End: 2024-10-19 | Stop reason: HOSPADM

## 2024-10-19 RX ORDER — IOPAMIDOL 755 MG/ML
67 INJECTION, SOLUTION INTRAVASCULAR ONCE
Status: COMPLETED | OUTPATIENT
Start: 2024-10-19 | End: 2024-10-19

## 2024-10-19 RX ORDER — ACETAMINOPHEN 325 MG/1
975 TABLET ORAL ONCE
Status: COMPLETED | OUTPATIENT
Start: 2024-10-19 | End: 2024-10-19

## 2024-10-19 RX ADMIN — IOPAMIDOL 67 ML: 755 INJECTION, SOLUTION INTRAVENOUS at 18:49

## 2024-10-19 RX ADMIN — ONDANSETRON 4 MG: 2 INJECTION INTRAMUSCULAR; INTRAVENOUS at 17:43

## 2024-10-19 RX ADMIN — GADOBUTROL 6.53 ML: 604.72 INJECTION INTRAVENOUS at 19:51

## 2024-10-19 RX ADMIN — SODIUM CHLORIDE 80 ML: 9 INJECTION, SOLUTION INTRAVENOUS at 18:50

## 2024-10-19 RX ADMIN — ACETAMINOPHEN 325MG 975 MG: 325 TABLET ORAL at 17:48

## 2024-10-19 ASSESSMENT — COLUMBIA-SUICIDE SEVERITY RATING SCALE - C-SSRS
1. IN THE PAST MONTH, HAVE YOU WISHED YOU WERE DEAD OR WISHED YOU COULD GO TO SLEEP AND NOT WAKE UP?: NO
6. HAVE YOU EVER DONE ANYTHING, STARTED TO DO ANYTHING, OR PREPARED TO DO ANYTHING TO END YOUR LIFE?: NO
2. HAVE YOU ACTUALLY HAD ANY THOUGHTS OF KILLING YOURSELF IN THE PAST MONTH?: NO

## 2024-10-19 ASSESSMENT — ACTIVITIES OF DAILY LIVING (ADL)
ADLS_ACUITY_SCORE: 38

## 2024-10-19 NOTE — ED PROVIDER NOTES
ED APC SUPERVISION NOTE:   I evaluated this patient in conjunction with Jaron [place] NP I have participated in the care of the patient and personally performed key elements of the history, exam, and medical decision making.      HPI:   Charlette Rodgers is a 69 year old female with a history of type II diabetes and hypertension who presents with her daughter to the Emergency Department for hypertension. The patient reports she had dizziness and nausea when she woke up this morning, accompanied by lightheadedness that made her feel as though she would faint. She says the dizziness worsened with movement. She vomited and had both a slight headache. She endorses some tingling in her left fingers.  She denies any fever, chills, chest pain or abdominal pain, dysuria/hematuria, or black or bloody stools.    Daughter adds that on on 9/30/24 the patient developed a room spinning sensation that developed into nausea, but denies any room spinning dizziness noted today. She reports that she recently her blood pressure medication was adjusted to metoprolol in July due to faster heart rates by her cardiologist and was told to discontinue her lisinopril.  However, today, her blood pressure was running higher than her usual baseline at 147 was in the 200 range, so she took a dose of her lisinopril today prior to arrival to the ER.     At this time, patient has headache, mild dizziness, but otherwise feeling improved.    Independent Historian:   Daughter as detailed above.    Review of External Notes:   Nursing telephone triage note from today.     EXAM:   Constitutional:       General: Not in acute distress.     Appearance: Normal appearance  HENT:      Head: Normocephalic and atraumatic.   Eyes:     Extraocular Movements: Extraocular movements intact.      Conjunctiva/sclera: Conjunctivae normal.      Pupils: Pupils are equal, round, and reactive to light.   Cardiovascular:     Rate and Rhythm: Normal rate and regular rhythm.    Pulmonary:      Effort: Pulmonary effort is normal.      Breath sounds: Normal breath sounds.   Abdominal:      General: Abdomen is flat. There is no distension.      Palpations: Abdomen is soft.      Tenderness: There is no abdominal tenderness.   Musculoskeletal:      Cervical back: Normal range of motion and neck supple. No rigidity.      General: No swelling or deformity.   Skin:     General: Skin is warm and dry.   Neurological:      General: No focal deficit present.  No arm drift bilaterally.  No leg drift bilaterally.  Even smile/face symmetric.     Mental Status: Alert and oriented to person, place, and time.   Psychiatric:         Mood and Affect: Mood normal.         Behavior: Behavior normal.     Independent Interpretation (X-rays, CTs, rhythm strip):  On my independent interpretation of chest x-ray, there is no obvious focal opacity, mediastinal widening, or pneumothorax.    ECG results from 10/19/24   EKG 12-lead, tracing only     Value    Systolic Blood Pressure     Diastolic Blood Pressure     Ventricular Rate 85    Atrial Rate 85    CO Interval 140    QRS Duration 84        QTc 468    P Axis 43    R AXIS 3    T Axis 8    Interpretation ECG      Sinus rhythm  Normal ECG  When compared with ECG of 02-Jul-2024 20:46,  No significant change was found  Interpretation done by me at 1733  See ED course for independent interpretation.     Consultations/Discussion of Management or Tests:  See APC note for details     MEDICAL DECISION MAKING/ASSESSMENT AND PLAN:   69-year-old female as described above presents to the emergency department for elevated blood pressure higher than baseline in addition to associated headache and dizziness/lightheadedness.  Patient hemodynamically stable at time of evaluation.  Afebrile.  Recent similar episode end of last month, but with associated room spinning dizziness.  At time of evaluation, patient had improvement in blood pressure without intervention likely  secondary to home dose lisinopril.  No focal neurological deficits.  Differential diagnosis considered includes, but not limited to, symptomatic hypertension, hypertensive emergency/urgency, posterior circulation CVA, TIA, neck vessel dissection, BPPV, vestibular neuritis, and labyrinthitis.  Hypertension workup ordered for evaluation for endorgan damage.  Given there is some component of headache, will obtain CT head and CTA head and neck for evaluation for ICH/dissection/SAH.  MR brain with and without contrast for evaluation for posterior circulation CVA.  Discussed care plan with patient who voiced understanding and agreement with plan.  Answered all questions.  Additional workup and orders as listed in chart.    Ultimately, work up shows no acute findings on CT head, CTA head and neck, and MRI brain with and without contrast.  No evidence of cardiomegaly or CHF on chest x-ray. Ultimately, patient's blood pressure demonstrates significant improvement in 126/86 at discharge.  Patient required no further blood pressure medication management in the ED.  Recommend patient follow-up with cardiologist regarding further blood pressure medication management.  Patient feeling improved.  Patient feeling comfortable discharge home.  Discussed return precautions.  Answered all questions.    Please refer to ED course above as part of continuation of MDM for details on the patient's treatment course and any potential changes or updates beyond my initial evaluation and MDM creation.     DIAGNOSIS:     ICD-10-CM    1. Hypertension  I10       2. Headache  R51.9       3. Dizziness  R42           Scribe Disclosure:  I, Billie Boswell, am serving as a scribe at 5:24 PM on 10/19/2024 to document services personally performed by Bernardo Sunshine DO based on my observations and the provider's statements to me.   10/19/2024  [facility] EMERGENCY DEPT     Bernardo Sunshine DO  10/20/24 0058

## 2024-10-19 NOTE — ED TRIAGE NOTES
Pt arrives with daughter for high BP, SOB, dizziness, vomiting starting at the beginning of October and worsening to now. Takes metoprolol but BP was in 200s systolic this past week. Denies heart surgeries. Hypertensive in triage.

## 2024-10-19 NOTE — ED NOTES
Pt reports HTN since beginning of October then started having headache and N/V last night. Denies visual change. Pt alert and oriented. Daughter at bedside.

## 2024-10-20 LAB
ATRIAL RATE - MUSE: 85 BPM
DIASTOLIC BLOOD PRESSURE - MUSE: NORMAL MMHG
INTERPRETATION ECG - MUSE: NORMAL
P AXIS - MUSE: 43 DEGREES
PR INTERVAL - MUSE: 140 MS
QRS DURATION - MUSE: 84 MS
QT - MUSE: 394 MS
QTC - MUSE: 468 MS
R AXIS - MUSE: 3 DEGREES
SYSTOLIC BLOOD PRESSURE - MUSE: NORMAL MMHG
T AXIS - MUSE: 8 DEGREES
VENTRICULAR RATE- MUSE: 85 BPM

## 2024-10-20 NOTE — ED PROVIDER NOTES
Emergency Department Note      History of Present Illness     Chief Complaint   Hypertension      HPI   Charlette Rodgers is a 69 year old female who presents to the emergency department for the evaluation of dizziness, nausea, and hypertension.  Past medical history includes type 2 diabetes and hypertension.  Patient reports that she was experiencing increasing dizziness earlier today.  Her daughter states that she obtained her mother's blood pressure and systolic blood pressure was found to be over the 200.  Daughter states that she reached out to the nurse triage line who prompted her to present to the emergency department for further evaluation.  She denies any recent fever or chills.  She does report that she had mild nausea earlier today and vomited.  She denies any diarrhea or urinary symptoms.  She denies any changes in vision but does endorse mild numbness to her left fingers, however she does states she has diabetic neuropathy.  She states that her headache is generalized and is not localized to any area of her head.  She describes it as a dull persistent ache.  She also reports that her cardiology provider had discontinued her lisinopril dose and she is only taking metoprolol for her blood pressure, however given the elevation of her blood pressure she did take one of her previously prescribed lisinopril tablets.     Independent Historian   None    Review of External Notes   None    Past Medical History     Medical History and Problem List   Past Medical History:   Diagnosis Date    Benign essential hypertension     Gastroesophageal reflux disease without esophagitis     Hyperlipidemia LDL goal <100     Osteoporosis     T2DM (type 2 diabetes mellitus) (H)        Medications   alendronate (FOSAMAX) 70 MG tablet  aspirin 81 MG EC tablet  calcium carbonate-vitamin D (OSCAL) 500-5 MG-MCG tablet  EPINEPHrine (ANY BX GENERIC EQUIV) 0.3 MG/0.3ML injection 2-pack  FARXIGA 10 MG TABS tablet  gabapentin (NEURONTIN)  "600 MG tablet  lisinopril (ZESTRIL) 20 MG tablet  metFORMIN (GLUCOPHAGE) 1000 MG tablet  multivitamin w/minerals (MULTI-VITAMIN) tablet  omeprazole (PRILOSEC) 20 MG DR capsule  Semaglutide, 2 MG/DOSE, (OZEMPIC, 2 MG/DOSE,) 8 MG/3ML pen  simvastatin (ZOCOR) 40 MG tablet        Surgical History   Past Surgical History:   Procedure Laterality Date     SECTION      CHOLECYSTECTOMY         Physical Exam     Patient Vitals for the past 24 hrs:   BP Temp Temp src Pulse Resp SpO2 Height Weight   10/19/24 2051 -- -- -- -- -- 96 % -- --   10/19/24 2050 -- -- -- -- -- 95 % -- --   10/19/24 2049 -- -- -- -- -- 95 % -- --   10/19/24 2048 -- -- -- -- -- 94 % -- --   10/19/24 2047 -- -- -- -- -- 92 % -- --   10/19/24 2046 -- -- -- -- -- 92 % -- --   10/19/24 2045 126/86 -- -- 79 -- 92 % -- --   10/19/24 1918 -- -- -- -- -- 95 % -- --   10/19/24 1917 -- -- -- -- -- 95 % -- --   10/19/24 1916 -- -- -- -- -- 95 % -- --   10/19/24 1915 (!) 140/87 -- -- 79 -- 95 % -- --   10/19/24 1843 -- -- -- -- -- 95 % -- --   10/19/24 1841 -- -- -- -- -- 94 % -- --   10/19/24 183 (!) 161/89 -- -- 82 -- 96 % -- --   10/19/24 1815 (!) 171/97 -- -- 81 -- 96 % -- --   10/19/24 1800 (!) 174/109 -- -- 88 -- 96 % -- --   10/19/24 1745 (!) 162/95 -- -- 84 -- 95 % -- --   10/19/24 1740 (!) 153/102 -- -- 86 -- 95 % -- --   10/19/24 1700 (!) 174/92 98.6  F (37  C) -- 87 -- 94 % -- --   10/19/24 1644 (!) 200/107 98.1  F (36.7  C) Temporal 87 20 97 % 1.651 m (5' 5\") 65.3 kg (143 lb 15.4 oz)     Physical Exam  General: Awake, alert, non-toxic.  Head:  Scalp is NC/AT  Eyes:  Conjunctiva normal, PERRL  ENT:  The external nose and ears are normal.     Oropharynx clear, uvula midline.  Neck:  Normal range of motion without rigidity.  CV:  Regular rate and rhythm    No pathologic murmur, rubs, or gallops.  Resp:  Breath sounds are clear bilaterally    Non-labored, no retractions or accessory muscle use  Abdomen: Abdomen is soft, no distension, no tenderness, " no masses. No CVA tenderness.  MS:  No lower extremity edema/swelling. No midline cervical, thoracic, or lumbar tenderness.  Extremities without joint swelling or redness.  Skin:  Warm and dry, No rash or lesions noted.  Neuro:  Alert and oriented.  GCS 15 Moves all extremities normal.  No facial asymmetry. Gait normal.  Psych: Awake. Alert. Normal affect. Appropriate interactions.     Diagnostics     Lab Results   Labs Ordered and Resulted from Time of ED Arrival to Time of ED Departure   COMPREHENSIVE METABOLIC PANEL - Abnormal       Result Value    Sodium 141      Potassium 3.6      Carbon Dioxide (CO2) 22      Anion Gap 17 (*)     Urea Nitrogen 12.6      Creatinine 0.50 (*)     GFR Estimate >90      Calcium 10.4      Chloride 102      Glucose 130 (*)     Alkaline Phosphatase 52      AST 41      ALT 56 (*)     Protein Total 7.9      Albumin 4.9      Bilirubin Total 0.6     ROUTINE UA WITH MICROSCOPIC REFLEX TO CULTURE - Abnormal    Color Urine Straw      Appearance Urine Clear      Glucose Urine Negative      Bilirubin Urine Negative      Ketones Urine Negative      Specific Gravity Urine 1.007      Blood Urine Negative      pH Urine 7.0      Protein Albumin Urine 70 (*)     Urobilinogen Urine Normal      Nitrite Urine Negative      Leukocyte Esterase Urine Negative      RBC Urine <1      WBC Urine <1     TROPONIN T, HIGH SENSITIVITY - Normal    Troponin T, High Sensitivity <6     LIPASE - Normal    Lipase 58     NT PROBNP INPATIENT - Normal    N terminal Pro BNP Inpatient 51     CBC WITH PLATELETS AND DIFFERENTIAL    WBC Count 4.8      RBC Count 4.76      Hemoglobin 14.5      Hematocrit 42.4      MCV 89      MCH 30.5      MCHC 34.2      RDW 12.0      Platelet Count 181      % Neutrophils 51      % Lymphocytes 38      % Monocytes 8      % Eosinophils 2      % Basophils 1      % Immature Granulocytes 0      NRBCs per 100 WBC 0      Absolute Neutrophils 2.5      Absolute Lymphocytes 1.9      Absolute Monocytes 0.4       Absolute Eosinophils 0.1      Absolute Basophils 0.0      Absolute Immature Granulocytes 0.0      Absolute NRBCs 0.0         Imaging   MR Brain w/o & w Contrast   Final Result   IMPRESSION:      1.  No acute intracranial abnormality.   2.  No intracranial mass lesion.   3.  Mild senescent changes as above.      CTA Head Neck with Contrast   Final Result   IMPRESSION:    HEAD CTA:    1.  No evidence of pathologic vascular occlusion or saccular aneurysm within the visualized intracranial circulation by CT angiography.      NECK CTA:   1.  No evidence of hemodynamically significant stenosis within either internal carotid artery within the neck.   2.  No evidence of arterial dissection.      XR Chest 2 Views   Final Result   IMPRESSION: Negative chest.      Head CT w/o contrast   Final Result   IMPRESSION:     1.  No evidence of acute intracranial hemorrhage or mass effect.   2.  Mild nonspecific white matter changes.   3.  Mild brain parenchymal volume loss.          EKG   ECG results from 10/19/24   EKG 12-lead, tracing only     Value    Systolic Blood Pressure     Diastolic Blood Pressure     Ventricular Rate 85    Atrial Rate 85    AR Interval 140    QRS Duration 84        QTc 468    P Axis 43    R AXIS 3    T Axis 8    Interpretation ECG      Sinus rhythm  Normal ECG  When compared with ECG of 02-Jul-2024 20:46,  No significant change was found        Independent Interpretation   None    ED Course      Medications Administered   Medications   ondansetron (ZOFRAN) injection 4 mg (4 mg Intravenous $Given 10/19/24 4355)   acetaminophen (TYLENOL) tablet 975 mg (975 mg Oral $Given 10/19/24 1748)   iopamidol (ISOVUE-370) solution 67 mL (67 mLs Intravenous $Given 10/19/24 5772)   sodium chloride 0.9 % bag 500mL for CT scan flush use (80 mLs Intravenous $Given 10/19/24 1850)   gadobutrol (GADAVIST) injection 6.53 mL (6.53 mLs Intravenous $Given 10/19/24 1951)       Procedures   Procedures     Discussion of  Management   None    ED Course   ED Course as of 10/19/24 2111   Sat Oct 19, 2024   1721 BP(!): 174/92  Improved   1733 EKG 12-lead, tracing only  Normal sinus rhythm.  Rate of 85.  Normal ME and QRS.  QTc 468.  No acute ST elevation or depression as compared with 7/2/2024 EKG.   1800 I evaluated the patient, obtained history, and performed a physical exam as detailed above.    1908 Head CT w/o contrast  Neg   1916 CTA Head Neck with Contrast  neg   1916 XR Chest 2 Views  On my independent interpretation of chest x-ray, there is no obvious focal opacity, mediastinal widening, or pneumothorax.       Additional Documentation  None    Medical Decision Making / Diagnosis     CMS Diagnoses: None    MIPS       None    MDM   Charlette Rodgers is a 69 year old female who presents emergency department for the evaluation of dizziness, nausea, and hypertension.  Initial examination patient is awake and alert.  Patient reported that she was hypertensive at home, however patient blood pressure here in the emergency department has significantly dropped.  Upon initial examination systolic blood pressure was in the 160s.  I believe this is probably secondary to her lisinopril dosage that she had taken prior to presenting the emergency department.  Due to the patient's onset of symptoms and headache, as well as dizziness I did elect to perform a head CT which was negative for any acute intracranial hemorrhage.  CTA of head and neck was negative for any dissection or blockage.  Patient continues to have a persistent headache here in the emergency department so MRI was performed which was negative for any acute findings.  CBC did not reveal acute leukocytosis.  Hemoglobin is stable at 14.5 no concerns for anemia.  Urinalysis was negative for any urinary tract infection.  EKG did not reveal any acute ST elevation or depression, troponin was negative.  Upon reading evaluation systolic blood pressure had come down to the 120s.  Patient  stated that she has had complete resolution of her symptoms.  I believe that her symptoms that she had been experiencing earlier today are most likely secondary to uncontrolled hypertension.  I did encourage her to follow-up with her primary care provider or cardiologist as soon as possible as she will need further management of her hypertension.  Red flag symptoms and return criteria were heavily discussed and patient verbalized understanding.  Patient was comfortable for discharge.  All questions and concerns were addressed and patient verbalized understanding patient was discharged.  Disposition   The patient was discharged.     Diagnosis     ICD-10-CM    1. Hypertension  I10       2. Headache  R51.9       3. Dizziness  R42            Discharge Medications   Discharge Medication List as of 10/19/2024  8:49 PM            SYDNI Hadley CNP, [place], APRN CNP  10/19/24 2114

## 2024-10-20 NOTE — DISCHARGE INSTRUCTIONS
Discharge Instructions  Hypertension - High Blood Pressure    During you visit to the Emergency Department, your blood pressure was higher than the recommended blood pressure.  This may be related to stress, pain, medication or other temporary conditions. In these cases, your blood pressure may return to normal on its own. If you have a history of high blood pressure, you may need to have your provider adjust your medications. Sometimes, your high measurement here may indicate that you have developed high blood pressure that will stay high unless it is treated. As a general rule, high blood pressure causes problems over years rather than days, weeks, or months. So, while it is important to treat blood pressure, it is rarely important to treat blood pressure immediately. Occasionally we will begin a medication in the Emergency Department; more often we will recommend close follow-up for medications with a primary doctor/clinic.    Generally, every Emergency Department visit should have a follow-up clinic visit with either a primary or a specialty clinic/provider. Please follow-up as instructed by your emergency provider today.    Return to the Emergency Department if you start to have:  A severe headache.  Chest pain.  Shortness of breath.  Weakness or numbness that affects one part of the body.  Confusion.  Vision changes.  Significant swelling of legs and/or eyes.  A reaction to any medication started in the Emergency Department.    What can I do to help myself?  Avoid alcohol.  Take any blood pressure medicine that you are prescribed.  Get a good night s sleep.  Lower your salt intake.  Exercise.  Lose weight.  Manage stress.  See your doctor regularly    If blood pressure medication was started in the Emergency Department:  The medicine may not have an immediate effect. The body and brain determine what blood pressure you have. The medicine s job is to retrain the body s  thermostat  to a lower blood  pressure.  You will need to follow up with your provider to see how this medicine is working for you.  If you were given a prescription for medicine here today, be sure to read all of the information (including the package insert) that comes with your prescription.  This will include important information about the medicine, its side effects, and any warnings that you need to know about.  The pharmacist who fills the prescription can provide more information and answer questions you may have about the medicine.  If you have questions or concerns that the pharmacist cannot address, please call or return to the Emergency Department.   Remember that you can always come back to the Emergency Department if you are not able to see your regular provider in the amount of time listed above, if you get any new symptoms, or if there is anything that worries you.     Discharge Instructions  Dizziness (Lightheaded)  Today you were seen for dizziness.  Dizziness can be caused by many things and it can be very difficult to determine the cause of dizziness.  At this time, your provider has found no signs that your dizziness is due to a serious or life-threatening condition. However, sometimes there is a serious problem that does not show up right away, and it is important for you to follow up with your regular provider as instructed.  Generally, every Emergency Department visit should have a follow-up clinic visit with either a primary or a specialty clinic/provider. Please follow-up as instructed by your emergency provider today.      Return to the Emergency Department if:    You pass out (fainting or falling out), especially during exercise.    You develop chest pain, chest pressure or difficulty breathing.  Your feel an irregular heartbeat.  You have excessive vaginal bleeding, or blood in your stool or vomit (throw up).  You have a high fever.  Your symptoms get worse or more frequent.    If when you begin to feel dizzy or  lightheaded, it is important to sit down or lay down immediately to prevent injury from falling.  If you were given a prescription for medicine here today, be sure to read all of the information (including the package insert) that comes with your prescription.  This will include important information about the medicine, its side effects, and any warnings that you need to know about.  The pharmacist who fills the prescription can provide more information and answer questions you may have about the medicine.  If you have questions or concerns that the pharmacist cannot address, please call or return to the Emergency Department.   Remember that you can always come back to the Emergency Department if you are not able to see your regular provider in the amount of time listed above, if you get any new symptoms, or if there is anything that worries you.

## 2025-03-02 ENCOUNTER — APPOINTMENT (OUTPATIENT)
Dept: CT IMAGING | Facility: CLINIC | Age: 70
End: 2025-03-02
Attending: STUDENT IN AN ORGANIZED HEALTH CARE EDUCATION/TRAINING PROGRAM
Payer: COMMERCIAL

## 2025-03-02 ENCOUNTER — APPOINTMENT (OUTPATIENT)
Dept: GENERAL RADIOLOGY | Facility: CLINIC | Age: 70
End: 2025-03-02
Attending: STUDENT IN AN ORGANIZED HEALTH CARE EDUCATION/TRAINING PROGRAM
Payer: COMMERCIAL

## 2025-03-02 ENCOUNTER — HOSPITAL ENCOUNTER (EMERGENCY)
Facility: CLINIC | Age: 70
Discharge: HOME | End: 2025-03-02
Attending: STUDENT IN AN ORGANIZED HEALTH CARE EDUCATION/TRAINING PROGRAM | Admitting: STUDENT IN AN ORGANIZED HEALTH CARE EDUCATION/TRAINING PROGRAM
Payer: COMMERCIAL

## 2025-03-02 VITALS
DIASTOLIC BLOOD PRESSURE: 75 MMHG | HEART RATE: 87 BPM | SYSTOLIC BLOOD PRESSURE: 129 MMHG | RESPIRATION RATE: 18 BRPM | BODY MASS INDEX: 28.52 KG/M2 | OXYGEN SATURATION: 98 % | HEIGHT: 60 IN | WEIGHT: 145.28 LBS | TEMPERATURE: 97.6 F

## 2025-03-02 DIAGNOSIS — R10.9 ABDOMINAL PAIN, UNSPECIFIED ABDOMINAL LOCATION: ICD-10-CM

## 2025-03-02 DIAGNOSIS — R11.2 NAUSEA VOMITING AND DIARRHEA: ICD-10-CM

## 2025-03-02 DIAGNOSIS — M79.89 PAIN AND SWELLING OF TOE, LEFT: ICD-10-CM

## 2025-03-02 DIAGNOSIS — R19.7 NAUSEA VOMITING AND DIARRHEA: ICD-10-CM

## 2025-03-02 DIAGNOSIS — M79.675 PAIN AND SWELLING OF TOE, LEFT: ICD-10-CM

## 2025-03-02 LAB
ALBUMIN SERPL BCG-MCNC: 4.6 G/DL (ref 3.5–5.2)
ALBUMIN UR-MCNC: NEGATIVE MG/DL
ALP SERPL-CCNC: 54 U/L (ref 40–150)
ALT SERPL W P-5'-P-CCNC: 61 U/L (ref 0–50)
ANION GAP SERPL CALCULATED.3IONS-SCNC: 14 MMOL/L (ref 7–15)
APPEARANCE UR: CLEAR
AST SERPL W P-5'-P-CCNC: 63 U/L (ref 0–45)
BASOPHILS # BLD AUTO: 0 10E3/UL (ref 0–0.2)
BASOPHILS NFR BLD AUTO: 1 %
BILIRUB SERPL-MCNC: 0.6 MG/DL
BILIRUB UR QL STRIP: NEGATIVE
BUN SERPL-MCNC: 18.4 MG/DL (ref 8–23)
CALCIUM SERPL-MCNC: 9.3 MG/DL (ref 8.8–10.4)
CHLORIDE SERPL-SCNC: 106 MMOL/L (ref 98–107)
COLOR UR AUTO: NORMAL
CREAT SERPL-MCNC: 0.49 MG/DL (ref 0.51–0.95)
EGFRCR SERPLBLD CKD-EPI 2021: >90 ML/MIN/1.73M2
EOSINOPHIL # BLD AUTO: 0.1 10E3/UL (ref 0–0.7)
EOSINOPHIL NFR BLD AUTO: 3 %
ERYTHROCYTE [DISTWIDTH] IN BLOOD BY AUTOMATED COUNT: 12.3 % (ref 10–15)
GLUCOSE BLDC GLUCOMTR-MCNC: 180 MG/DL (ref 70–99)
GLUCOSE SERPL-MCNC: 198 MG/DL (ref 70–99)
GLUCOSE UR STRIP-MCNC: NEGATIVE MG/DL
HCO3 BLDV-SCNC: 19 MMOL/L (ref 21–28)
HCO3 SERPL-SCNC: 18 MMOL/L (ref 22–29)
HCT VFR BLD AUTO: 42.4 % (ref 35–47)
HGB BLD-MCNC: 14.3 G/DL (ref 11.7–15.7)
HGB UR QL STRIP: NEGATIVE
IMM GRANULOCYTES # BLD: 0 10E3/UL
IMM GRANULOCYTES NFR BLD: 0 %
KETONES UR STRIP-MCNC: NEGATIVE MG/DL
LACTATE BLD-SCNC: 2.3 MMOL/L
LACTATE SERPL-SCNC: 1.5 MMOL/L (ref 0.7–2)
LACTATE SERPL-SCNC: 2.3 MMOL/L (ref 0.7–2)
LEUKOCYTE ESTERASE UR QL STRIP: NEGATIVE
LIPASE SERPL-CCNC: 73 U/L (ref 13–60)
LYMPHOCYTES # BLD AUTO: 1.3 10E3/UL (ref 0.8–5.3)
LYMPHOCYTES NFR BLD AUTO: 29 %
MCH RBC QN AUTO: 30.6 PG (ref 26.5–33)
MCHC RBC AUTO-ENTMCNC: 33.7 G/DL (ref 31.5–36.5)
MCV RBC AUTO: 91 FL (ref 78–100)
MONOCYTES # BLD AUTO: 0.3 10E3/UL (ref 0–1.3)
MONOCYTES NFR BLD AUTO: 7 %
NEUTROPHILS # BLD AUTO: 2.8 10E3/UL (ref 1.6–8.3)
NEUTROPHILS NFR BLD AUTO: 60 %
NITRATE UR QL: NEGATIVE
NRBC # BLD AUTO: 0 10E3/UL
NRBC BLD AUTO-RTO: 0 /100
PCO2 BLDV: 33 MM HG (ref 40–50)
PH BLDV: 7.36 [PH] (ref 7.32–7.43)
PH UR STRIP: 6 [PH] (ref 5–7)
PLATELET # BLD AUTO: 178 10E3/UL (ref 150–450)
PO2 BLDV: 46 MM HG (ref 25–47)
POTASSIUM SERPL-SCNC: 3.8 MMOL/L (ref 3.4–5.3)
PROT SERPL-MCNC: 7.5 G/DL (ref 6.4–8.3)
RBC # BLD AUTO: 4.68 10E6/UL (ref 3.8–5.2)
RBC URINE: 1 /HPF
SAO2 % BLDV: 81 % (ref 70–75)
SODIUM SERPL-SCNC: 138 MMOL/L (ref 135–145)
SP GR UR STRIP: 1 (ref 1–1.03)
UROBILINOGEN UR STRIP-MCNC: NORMAL MG/DL
WBC # BLD AUTO: 4.6 10E3/UL (ref 4–11)
WBC URINE: <1 /HPF

## 2025-03-02 PROCEDURE — 83605 ASSAY OF LACTIC ACID: CPT

## 2025-03-02 PROCEDURE — 81001 URINALYSIS AUTO W/SCOPE: CPT | Performed by: STUDENT IN AN ORGANIZED HEALTH CARE EDUCATION/TRAINING PROGRAM

## 2025-03-02 PROCEDURE — 250N000009 HC RX 250: Performed by: STUDENT IN AN ORGANIZED HEALTH CARE EDUCATION/TRAINING PROGRAM

## 2025-03-02 PROCEDURE — 85025 COMPLETE CBC W/AUTO DIFF WBC: CPT | Performed by: STUDENT IN AN ORGANIZED HEALTH CARE EDUCATION/TRAINING PROGRAM

## 2025-03-02 PROCEDURE — 83690 ASSAY OF LIPASE: CPT | Performed by: STUDENT IN AN ORGANIZED HEALTH CARE EDUCATION/TRAINING PROGRAM

## 2025-03-02 PROCEDURE — 96374 THER/PROPH/DIAG INJ IV PUSH: CPT | Mod: 59

## 2025-03-02 PROCEDURE — 80053 COMPREHEN METABOLIC PANEL: CPT | Performed by: STUDENT IN AN ORGANIZED HEALTH CARE EDUCATION/TRAINING PROGRAM

## 2025-03-02 PROCEDURE — 258N000003 HC RX IP 258 OP 636: Performed by: STUDENT IN AN ORGANIZED HEALTH CARE EDUCATION/TRAINING PROGRAM

## 2025-03-02 PROCEDURE — 84460 ALANINE AMINO (ALT) (SGPT): CPT | Performed by: STUDENT IN AN ORGANIZED HEALTH CARE EDUCATION/TRAINING PROGRAM

## 2025-03-02 PROCEDURE — 82310 ASSAY OF CALCIUM: CPT | Performed by: STUDENT IN AN ORGANIZED HEALTH CARE EDUCATION/TRAINING PROGRAM

## 2025-03-02 PROCEDURE — 99285 EMERGENCY DEPT VISIT HI MDM: CPT | Mod: 25

## 2025-03-02 PROCEDURE — 82803 BLOOD GASES ANY COMBINATION: CPT

## 2025-03-02 PROCEDURE — 83605 ASSAY OF LACTIC ACID: CPT | Performed by: STUDENT IN AN ORGANIZED HEALTH CARE EDUCATION/TRAINING PROGRAM

## 2025-03-02 PROCEDURE — 73660 X-RAY EXAM OF TOE(S): CPT | Mod: LT

## 2025-03-02 PROCEDURE — 74177 CT ABD & PELVIS W/CONTRAST: CPT

## 2025-03-02 PROCEDURE — 82962 GLUCOSE BLOOD TEST: CPT

## 2025-03-02 PROCEDURE — 250N000011 HC RX IP 250 OP 636: Performed by: STUDENT IN AN ORGANIZED HEALTH CARE EDUCATION/TRAINING PROGRAM

## 2025-03-02 PROCEDURE — 36415 COLL VENOUS BLD VENIPUNCTURE: CPT | Performed by: STUDENT IN AN ORGANIZED HEALTH CARE EDUCATION/TRAINING PROGRAM

## 2025-03-02 PROCEDURE — 250N000013 HC RX MED GY IP 250 OP 250 PS 637: Performed by: STUDENT IN AN ORGANIZED HEALTH CARE EDUCATION/TRAINING PROGRAM

## 2025-03-02 PROCEDURE — 96361 HYDRATE IV INFUSION ADD-ON: CPT

## 2025-03-02 RX ORDER — CEPHALEXIN 500 MG/1
500 CAPSULE ORAL 3 TIMES DAILY
Qty: 21 CAPSULE | Refills: 0 | Status: SHIPPED | OUTPATIENT
Start: 2025-03-02 | End: 2025-03-09

## 2025-03-02 RX ORDER — CEPHALEXIN 500 MG/1
500 CAPSULE ORAL ONCE
Status: COMPLETED | OUTPATIENT
Start: 2025-03-02 | End: 2025-03-02

## 2025-03-02 RX ORDER — ONDANSETRON 2 MG/ML
4 INJECTION INTRAMUSCULAR; INTRAVENOUS ONCE
Status: COMPLETED | OUTPATIENT
Start: 2025-03-02 | End: 2025-03-02

## 2025-03-02 RX ORDER — ONDANSETRON 4 MG/1
4 TABLET, ORALLY DISINTEGRATING ORAL EVERY 8 HOURS PRN
Qty: 12 TABLET | Refills: 0 | Status: SHIPPED | OUTPATIENT
Start: 2025-03-02 | End: 2025-03-06

## 2025-03-02 RX ORDER — IOPAMIDOL 755 MG/ML
500 INJECTION, SOLUTION INTRAVASCULAR ONCE
Status: COMPLETED | OUTPATIENT
Start: 2025-03-02 | End: 2025-03-02

## 2025-03-02 RX ADMIN — SODIUM CHLORIDE 500 ML: 0.9 INJECTION, SOLUTION INTRAVENOUS at 12:58

## 2025-03-02 RX ADMIN — ONDANSETRON 4 MG: 2 INJECTION INTRAMUSCULAR; INTRAVENOUS at 10:18

## 2025-03-02 RX ADMIN — SODIUM CHLORIDE 58 ML: 9 INJECTION, SOLUTION INTRAVENOUS at 11:25

## 2025-03-02 RX ADMIN — CEPHALEXIN 500 MG: 500 CAPSULE ORAL at 14:51

## 2025-03-02 RX ADMIN — SODIUM CHLORIDE 1000 ML: 0.9 INJECTION, SOLUTION INTRAVENOUS at 10:23

## 2025-03-02 RX ADMIN — IOPAMIDOL 73 ML: 755 INJECTION, SOLUTION INTRAVENOUS at 11:25

## 2025-03-02 ASSESSMENT — ACTIVITIES OF DAILY LIVING (ADL)
ADLS_ACUITY_SCORE: 59

## 2025-03-02 NOTE — ED NOTES
ED APC SUPERVISION NOTE:   I evaluated this patient in conjunction with Nancy Potter PA-C  I have participated in the care of the patient and personally performed key elements of the history, exam, and medical decision making.      HPI:   Charlette Rodgers is a 69 year old female with a history of sepsis, type 2 diabetes mellitus, and diabetic polyneuropathy who presents with left 5th toe pain and bruising which she noticed yesterday. She notes she first noticed an ingrown nail and started picking at it the last 3-4 days.  She denies stubbing her toe. She has pain to this toe when walking. Today, the pain to her toe worsened and her discoloration spread 1 cm reports her daughter. She has had some nausea, vomiting, and diarrhea for three days. The first day she had 4 episodes of watery diarrhea and three episodes of vomiting. Today, she had two episodes of liquid diarrhea. She had not eaten or drank anything since last night. Yesterday, she had one egg and some chicken. No abdominal pain. She denies eating anything abnormal. No recent travel or antibiotic use. She is on Ozempic and he last injection was one week ago. She receive injection weekly. She also was placed on Metformin extended release two weeks ago which she had taken previously but had not been on for some time. Her daughter adds her blood glucose has been high in the morning and was 180 today and has been ranging from 236-246 the past few days. She notes she has normal sensation to her toes despite having neuropathy associated with her diabetes.     Independent Historian:   Daughter as detailed above.    Review of External Notes: I reviewed the patient;s discharge summary from 7/03/2024 for sepsis.      EXAM:   GENERAL: Patient well-appearing  HEAD: Atraumatic.  NECK: No rigidity  CV: RRR, no murmurs, rubs or gallops  PULM: CTAB with good aeration; no retractions, rales, rhonchi, or wheezing  ABD: Soft, nontender, nondistended, no guarding  DERM: Skin  warm and dry  EXTREMITY: Left fifth toe: some bruising around the base of the digit but the distal digit is pink and well-perfused. Tenderness over the base of the fifth toe.   VASCULAR: L DP 2+    Independent Interpretation (X-rays, CTs, rhythm strip):  X-ray left toe shows no fx    Assessments/Consultations/Discussion of Management or Tests:  1245 I obtained the patient's history and examined as noted above. She presents with her daughter.      MEDICAL DECISION MAKING/ASSESSMENT AND PLAN:   69-year-old female with diabetes, present with multiple days of nausea, vomiting, diarrhea, which are all overall improving.  Patient had labs that showed a minimally elevated lactate, I suspect secondary to dehydration.  After fluids, the lactic acidosis resolved.  CT scan of the abdomen reassuring.  She also has pain in her left fifth toe.  The left foot is otherwise warm well-perfused.  It looks like she potentially strained a ligament in the toe as the distal toes pink and well-perfused and she has some bony tenderness.  It does not look overtly infectious, but due to history of diabetes and peripheral neuropathy, will cover with Keflex.  Doubt vascular injury with strong pulse.  All questions answered.  Discharged in stable condition.  Given strict return precautions.     DIAGNOSIS:     ICD-10-CM    1. Nausea vomiting and diarrhea  R11.2     R19.7       2. Abdominal pain, unspecified abdominal location  R10.9       3. Pain and swelling of toe, left  M79.675     M79.89         Scribe Disclosure:  I, Janeen Martini, am serving as a scribe at 12:43 PM on 3/2/2025 to document services personally performed by Carlyle Wright MD based on my observations and the provider's statements to me.      Carlyle Wright MD  3/2/2025  [facility] EMERGENCY DEPT     Carlyle Wright MD  03/02/25 [id number]

## 2025-03-02 NOTE — DISCHARGE INSTRUCTIONS
Take antibiotic as prescribed  Use anti nausea medicine as needed  Follow up with your regular doctor this week for recheck of your toe and vomiting/diarrhea.  Ozempic has been known to cause GI upset and similar symptoms.  Discussed this with your PCP  Return to the Emergency Department if redness spreads toward your body, fevers, chills, or purulent drainage from the toe.

## 2025-03-02 NOTE — ED TRIAGE NOTES
Pt arrives ambulatory with daughter for left pinky toe pain. Started yesterday when picking at a callus and put antibiotic ointment on it. Toe worsening and now dusky purple in color and 3/10 pain. Daughter also reports pt's diabetes has been uncontrolled in the mornings and blood sugars have been high. No trauma noted to toe.  in triage.

## 2025-03-02 NOTE — ED PROVIDER NOTES
Emergency Department Note      History of Present Illness     Chief Complaint   Toe Pain      HPI   Charlette Rodgers is a 69 year old female with a history of diabetes who presents to the emergency department with her daughter for toe concern.  4 days ago, patient noticed a callus on her left pinky toe and started picking at it.  She tried to put some antibiotic ointment on it but it is now gotten red and painful.  Daughter reports that the redness is spreading down, prompting concern for infection.  Reports pain is mild but hurts more when it is touched or when she walks on it.  No previous episodes similar to this.  Additionally, she also has had 3 days of nausea and vomiting and diarrhea.  The stools are watery but not bloody.  Diarrhea has slowed down.  She has not vomited since Thursday, but still feels nauseous.  She was able to eat some food yesterday but has only had water today.  Denies abdominal pain.  She has history of cholecystectomy and C-sections.  When she was hospitalized last year for sepsis, daughter reports it was secondary to a GI infection.  She reports her diabetes and blood pressure have not been well-controlled since that hospitalization.  No fevers, chills, known trauma to the foot. she is on metformin and Ozempic but not insulin.  She took her blood pressure medicine this morning.    Independent Historian   Daughter as detailed above.    Review of External Notes   I reviewed IM note from 2/15/25 when seen for preop exam.  She has cataract surgery coming up.  History of type 2 diabetes    Past Medical History     Medical History and Problem List   Past Medical History:   Diagnosis Date    Benign essential hypertension     Gastroesophageal reflux disease without esophagitis     Hyperlipidemia LDL goal <100     Osteoporosis     T2DM (type 2 diabetes mellitus) (H)        Medications   alendronate (FOSAMAX) 70 MG tablet  aspirin 81 MG EC tablet  calcium carbonate-vitamin D (OSCAL) 500-5 MG-MCG  tablet  EPINEPHrine (ANY BX GENERIC EQUIV) 0.3 MG/0.3ML injection 2-pack  FARXIGA 10 MG TABS tablet  gabapentin (NEURONTIN) 600 MG tablet  lisinopril (ZESTRIL) 20 MG tablet  metFORMIN (GLUCOPHAGE) 1000 MG tablet  multivitamin w/minerals (MULTI-VITAMIN) tablet  omeprazole (PRILOSEC) 20 MG DR capsule  Semaglutide, 2 MG/DOSE, (OZEMPIC, 2 MG/DOSE,) 8 MG/3ML pen  simvastatin (ZOCOR) 40 MG tablet      Surgical History   Past Surgical History:   Procedure Laterality Date     SECTION      CHOLECYSTECTOMY         Physical Exam     Patient Vitals for the past 24 hrs:   BP Temp Temp src Pulse Resp SpO2 Height Weight   25 0945 (!) 184/101 97.6  F (36.4  C) Temporal 103 18 99 % 1.524 m (5') 65.9 kg (145 lb 4.5 oz)     Physical Exam  Vital signs and nursing notes reviewed.    General:  Alert and oriented, no acute distress. Resting on bed with daughter at bedside.   Skin: Skin is warm and dry. No rash. No diaphoresis.  HEENT:   Head: Normocephalic, atraumatic. Facial features symmetric.   Eyes: Conjunctiva pink, sclera white. EOMs grossly intact.   Ears: Auricles without lesion, erythema, or edema.   Nose: Symmetric with no discharge.  Mouth and throat: Lips are moist with no lesions or edema  Neck: Normal range of motion. Neck supple with no lymphadenopathy.   CV:  Heart RRR. 2+ radial and Dorsalis Pedal pulses bilaterally.  Pulm/Chest: Chest wall expansion symmetric with no increased effort of breathing. Lungs clear and equal to auscultation bilaterally.   Abd: Abdomen is soft and tender to palpation in epigastrium without rebounding or guarding  M/S: Moves all extremities spontaneously.  Left foot: Tenderness to palpation and swelling of left pinky toe.  Purpleish erythema versus ecchymosis as shown in photo below of the proximal digit.  Tip of the toe is warm, pink, and well-perfused.            Diagnostics     Lab Results   Labs Ordered and Resulted from Time of ED Arrival to Time of ED Departure   GLUCOSE BY  METER - Abnormal       Result Value    GLUCOSE BY METER POCT 180 (*)    COMPREHENSIVE METABOLIC PANEL - Abnormal    Sodium 138      Potassium 3.8      Carbon Dioxide (CO2) 18 (*)     Anion Gap 14      Urea Nitrogen 18.4      Creatinine 0.49 (*)     GFR Estimate >90      Calcium 9.3      Chloride 106      Glucose 198 (*)     Alkaline Phosphatase 54      AST 63 (*)     ALT 61 (*)     Protein Total 7.5      Albumin 4.6      Bilirubin Total 0.6     LIPASE - Abnormal    Lipase 73 (*)    ISTAT GASES LACTATE VENOUS POCT - Abnormal    Lactic Acid POCT 2.3 (*)     Bicarbonate Venous POCT 19 (*)     O2 Sat, Venous POCT 81 (*)     pCO2 Venous POCT 33 (*)     pH Venous POCT 7.36      pO2 Venous POCT 46     LACTIC ACID WHOLE BLOOD - Abnormal    Lactic Acid 2.3 (*)    ROUTINE UA WITH MICROSCOPIC REFLEX TO CULTURE - Normal    Color Urine Straw      Appearance Urine Clear      Glucose Urine Negative      Bilirubin Urine Negative      Ketones Urine Negative      Specific Gravity Urine 1.005      Blood Urine Negative      pH Urine 6.0      Protein Albumin Urine Negative      Urobilinogen Urine Normal      Nitrite Urine Negative      Leukocyte Esterase Urine Negative      RBC Urine 1      WBC Urine <1     LACTIC ACID WHOLE BLOOD - Normal    Lactic Acid 1.5     CBC WITH PLATELETS AND DIFFERENTIAL    WBC Count 4.6      RBC Count 4.68      Hemoglobin 14.3      Hematocrit 42.4      MCV 91      MCH 30.6      MCHC 33.7      RDW 12.3      Platelet Count 178      % Neutrophils 60      % Lymphocytes 29      % Monocytes 7      % Eosinophils 3      % Basophils 1      % Immature Granulocytes 0      NRBCs per 100 WBC 0      Absolute Neutrophils 2.8      Absolute Lymphocytes 1.3      Absolute Monocytes 0.3      Absolute Eosinophils 0.1      Absolute Basophils 0.0      Absolute Immature Granulocytes 0.0      Absolute NRBCs 0.0     ISTAT GASES LACTATE VENOUS POCT     Imaging   CT Abdomen Pelvis w Contrast   Final Result   IMPRESSION:    1.  No acute  inflammatory bowel process nor obstruction.   2.  Scattered colonic diverticulosis.   3.  Diffuse hepatic steatosis.      XR Toe Left G/E 2 Views   Final Result   IMPRESSION: Normal joint spaces and alignment. No fracture.        Independent Interpretation   I reviewed the foot x-ray and appreciate no acute fracture    ED Course      Medications Administered   Medications   ondansetron (ZOFRAN) injection 4 mg (4 mg Intravenous $Given 3/2/25 1018)   sodium chloride 0.9% BOLUS 1,000 mL (0 mLs Intravenous Stopped 3/2/25 1244)   sodium chloride for CT scan flush use (58 mLs Intravenous $Given 3/2/25 1125)   iopamidol (ISOVUE-370) solution 500 mL (73 mLs Intravenous $Given 3/2/25 1125)   sodium chloride 0.9% BOLUS 500 mL (0 mLs Intravenous Stopped 3/2/25 1351)   cephALEXin (KEFLEX) capsule 500 mg (500 mg Oral $Given 3/2/25 1451)     Procedures   Procedures     Discussion of Management   None    ED Course   ED Course as of 03/02/25 1633   Sun Mar 02, 2025   0945 I initially assessed the patient and obtained the above history and physical exam.     1056 I reassessed the patient and updated them on results and plan of care.      1246 Dr. Wright assessed the patient     Additional Documentation  None    Medical Decision Making / Diagnosis     CMS Diagnoses: None    MIPS       None    MDM   Charlette Rodgers is a 69 year old female who presents to the emergency department for toe concern.  Upon history, she has also been having vomiting and diarrhea the past few days.  See HPI.  Vitals are stable.  Given history for concern of sepsis, lactic was obtained which is positive at 2.3.  I suspect this is due to dehydration given her recent GI illness.  She has mild tenderness to palpation in her upper abdomen and therefore CT was obtained which is fortunately negative for pancreatitis, cholecystitis, or other hepatobiliary abnormality.  There is no bowel obstruction, appendicitis, or other sinister intra-abdominal etiology noted.  Labs  are generally reassuring with a minimal elevation in her lipase and LFTs.  CBC without leukocytosis or anemia.  No renal derangement.  She was hydrated with both IV and oral fluids, and lactic acidosis resolved.   With regards to her toe, XR negative for fracture. Suspect musculoskeletal contusion given ecchymosis and tenderness at the base, but warm pink and well-perfused tip of the toe.  This is not consistent with ischemia.  There is no purulent or infectious drainage, however given her history of diabetes and peripheral neuropathy, will cover with Keflex.  She is strong distal pulses and sensation is intact.  Using reasonable clinical judgment, she is safe for discharge home.  Discussed close follow-up with primary care and strict return precautions.  Additionally, we discussed the possibility that Ozempic may be related to her on and off GI symptoms over the past year.   patient and daughter in the room are agreeable to plan and had questions answered.    I staffed this patient with Dr. Wright who agrees with the above assessment and plan.    Disposition   The patient was discharged.     Diagnosis     ICD-10-CM    1. Nausea vomiting and diarrhea  R11.2     R19.7       2. Abdominal pain, unspecified abdominal location  R10.9       3. Pain and swelling of toe, left  M79.675     M79.89            Discharge Medications   Discharge Medication List as of 3/2/2025  2:52 PM        START taking these medications    Details   cephALEXin (KEFLEX) 500 MG capsule Take 1 capsule (500 mg) by mouth 3 times daily for 7 days., Disp-21 capsule, R-0, E-Prescribe      ondansetron (ZOFRAN ODT) 4 MG ODT tab Take 1 tablet (4 mg) by mouth every 8 hours as needed for nausea or vomiting., Disp-12 tablet, R-0, E-Prescribe           Nancy Potter PA-C on 3/2/2025 at 4:39 PM       Nancy Potter PA-C  03/02/25 [id number]

## 2025-04-13 ENCOUNTER — HOSPITAL ENCOUNTER (EMERGENCY)
Facility: CLINIC | Age: 70
Discharge: HOME | End: 2025-04-13
Attending: EMERGENCY MEDICINE | Admitting: EMERGENCY MEDICINE
Payer: COMMERCIAL

## 2025-04-13 ENCOUNTER — APPOINTMENT (OUTPATIENT)
Dept: CT IMAGING | Facility: CLINIC | Age: 70
End: 2025-04-13
Attending: EMERGENCY MEDICINE
Payer: COMMERCIAL

## 2025-04-13 VITALS
RESPIRATION RATE: 18 BRPM | OXYGEN SATURATION: 96 % | BODY MASS INDEX: 29.04 KG/M2 | HEART RATE: 92 BPM | DIASTOLIC BLOOD PRESSURE: 84 MMHG | HEIGHT: 60 IN | TEMPERATURE: 98.7 F | WEIGHT: 147.93 LBS | SYSTOLIC BLOOD PRESSURE: 140 MMHG

## 2025-04-13 DIAGNOSIS — K12.2 UVULITIS: ICD-10-CM

## 2025-04-13 LAB
ANION GAP SERPL CALCULATED.3IONS-SCNC: 14 MMOL/L (ref 7–15)
BASOPHILS # BLD AUTO: 0 10E3/UL (ref 0–0.2)
BASOPHILS NFR BLD AUTO: 1 %
BUN SERPL-MCNC: 11.8 MG/DL (ref 8–23)
CALCIUM SERPL-MCNC: 9.6 MG/DL (ref 8.8–10.4)
CHLORIDE SERPL-SCNC: 105 MMOL/L (ref 98–107)
CREAT BLD-MCNC: 0.5 MG/DL (ref 0.5–1)
CREAT SERPL-MCNC: 0.47 MG/DL (ref 0.51–0.95)
EGFRCR SERPLBLD CKD-EPI 2021: >60 ML/MIN/1.73M2
EGFRCR SERPLBLD CKD-EPI 2021: >90 ML/MIN/1.73M2
EOSINOPHIL # BLD AUTO: 0.1 10E3/UL (ref 0–0.7)
EOSINOPHIL NFR BLD AUTO: 2 %
ERYTHROCYTE [DISTWIDTH] IN BLOOD BY AUTOMATED COUNT: 12.2 % (ref 10–15)
GLUCOSE SERPL-MCNC: 125 MG/DL (ref 70–99)
HCO3 SERPL-SCNC: 22 MMOL/L (ref 22–29)
HCT VFR BLD AUTO: 43.9 % (ref 35–47)
HGB BLD-MCNC: 14.5 G/DL (ref 11.7–15.7)
IMM GRANULOCYTES # BLD: 0 10E3/UL
IMM GRANULOCYTES NFR BLD: 0 %
LYMPHOCYTES # BLD AUTO: 1.6 10E3/UL (ref 0.8–5.3)
LYMPHOCYTES NFR BLD AUTO: 38 %
MCH RBC QN AUTO: 30.5 PG (ref 26.5–33)
MCHC RBC AUTO-ENTMCNC: 33 G/DL (ref 31.5–36.5)
MCV RBC AUTO: 92 FL (ref 78–100)
MONOCYTES # BLD AUTO: 0.3 10E3/UL (ref 0–1.3)
MONOCYTES NFR BLD AUTO: 8 %
NEUTROPHILS # BLD AUTO: 2.1 10E3/UL (ref 1.6–8.3)
NEUTROPHILS NFR BLD AUTO: 51 %
NRBC # BLD AUTO: 0 10E3/UL
NRBC BLD AUTO-RTO: 0 /100
PLATELET # BLD AUTO: 183 10E3/UL (ref 150–450)
POTASSIUM SERPL-SCNC: 4.4 MMOL/L (ref 3.4–5.3)
RBC # BLD AUTO: 4.75 10E6/UL (ref 3.8–5.2)
S PYO DNA THROAT QL NAA+PROBE: NOT DETECTED
SODIUM SERPL-SCNC: 141 MMOL/L (ref 135–145)
WBC # BLD AUTO: 4.1 10E3/UL (ref 4–11)

## 2025-04-13 PROCEDURE — 250N000009 HC RX 250: Performed by: EMERGENCY MEDICINE

## 2025-04-13 PROCEDURE — 250N000011 HC RX IP 250 OP 636: Performed by: EMERGENCY MEDICINE

## 2025-04-13 PROCEDURE — 99285 EMERGENCY DEPT VISIT HI MDM: CPT | Mod: 25

## 2025-04-13 PROCEDURE — 70491 CT SOFT TISSUE NECK W/DYE: CPT

## 2025-04-13 PROCEDURE — 85025 COMPLETE CBC W/AUTO DIFF WBC: CPT | Performed by: EMERGENCY MEDICINE

## 2025-04-13 PROCEDURE — 87651 STREP A DNA AMP PROBE: CPT | Performed by: EMERGENCY MEDICINE

## 2025-04-13 PROCEDURE — 84132 ASSAY OF SERUM POTASSIUM: CPT | Performed by: EMERGENCY MEDICINE

## 2025-04-13 PROCEDURE — 82565 ASSAY OF CREATININE: CPT

## 2025-04-13 PROCEDURE — 96374 THER/PROPH/DIAG INJ IV PUSH: CPT

## 2025-04-13 PROCEDURE — 36415 COLL VENOUS BLD VENIPUNCTURE: CPT | Performed by: EMERGENCY MEDICINE

## 2025-04-13 RX ORDER — IOPAMIDOL 755 MG/ML
500 INJECTION, SOLUTION INTRAVASCULAR ONCE
Status: COMPLETED | OUTPATIENT
Start: 2025-04-13 | End: 2025-04-13

## 2025-04-13 RX ORDER — DEXAMETHASONE SODIUM PHOSPHATE 10 MG/ML
10 INJECTION, SOLUTION INTRAMUSCULAR; INTRAVENOUS ONCE
Status: COMPLETED | OUTPATIENT
Start: 2025-04-13 | End: 2025-04-13

## 2025-04-13 RX ADMIN — SODIUM CHLORIDE 65 ML: 9 INJECTION, SOLUTION INTRAVENOUS at 13:46

## 2025-04-13 RX ADMIN — IOPAMIDOL 90 ML: 755 INJECTION, SOLUTION INTRAVENOUS at 13:48

## 2025-04-13 RX ADMIN — DEXAMETHASONE SODIUM PHOSPHATE 10 MG: 10 INJECTION, SOLUTION INTRAMUSCULAR; INTRAVENOUS at 12:49

## 2025-04-13 ASSESSMENT — ACTIVITIES OF DAILY LIVING (ADL)
ADLS_ACUITY_SCORE: 59

## 2025-04-13 ASSESSMENT — COLUMBIA-SUICIDE SEVERITY RATING SCALE - C-SSRS
1. IN THE PAST MONTH, HAVE YOU WISHED YOU WERE DEAD OR WISHED YOU COULD GO TO SLEEP AND NOT WAKE UP?: NO
2. HAVE YOU ACTUALLY HAD ANY THOUGHTS OF KILLING YOURSELF IN THE PAST MONTH?: NO
6. HAVE YOU EVER DONE ANYTHING, STARTED TO DO ANYTHING, OR PREPARED TO DO ANYTHING TO END YOUR LIFE?: NO

## 2025-04-13 NOTE — DISCHARGE INSTRUCTIONS
Your test today do not show foreign body in the neck or throat.  Otherwise, your exam is reassuring.  Return to the ED right away if you have difficulty tolerating oral fluids, swelling or secretions, or feel that the swelling is getting worse.  I placed a referral for ENT, and they should be calling you for follow-up in the next 1 to 2 days.

## 2025-04-13 NOTE — ED PROVIDER NOTES
Emergency Department Note      History of Present Illness     Chief Complaint   Pharyngitis      HPI   Charlette Rodgers is a 69 year old female with a history of type 2 diabetes, who presents to the emergency department today for evaluation of pharyngitis. The patient reports waking up this morning with mucus in her nose. She tried coughing it up, however feels like something is stuck in the back of her throat. This is making it painful to swallow, but she is able to swallow. Charlette reports feeling flushed. She denies eating any foods with small bones recently, fevers, or chills.     Independent Historian   None    Review of External Notes   I reviewed office visit note from 25 where patient presented for nausea, vomiting, and diarrhea.     Past Medical History     Medical History and Problem List   Type 2 diabetes  Osteoarthrosis involving lower leg  Diabetic polyneuropathy  Essential hypertension  GERD without esophagitis  Osteoporosis  Calcus of gallbladder without cholecystitis without obstruction    Medications   alendronate  aspirin   calcium carbonate-vitamin D  EPINEPHrine  FARXIGA 10 MG TABS tablet  gabapentin  lisinopril  metFORMIN   multivitamin w/minerals   omeprazole  Semaglutide  simvastatin  Glucosamine HCl  Metoprolol succinate  Calcium carb-cholecalciferol  Meclizine  Desonide     Surgical History    section  Cholecystectomy    Physical Exam     Patient Vitals for the past 24 hrs:   BP Temp Temp src Pulse Resp SpO2 Height Weight   25 1500 124/82 -- -- 89 -- 95 % -- --   25 1430 130/86 -- -- 89 -- 97 % -- --   25 1340 131/77 -- -- 82 -- 99 % -- --   25 1101 (!) 173/92 98.7  F (37.1  C) Oral 92 18 98 % 1.524 m (5') 67.1 kg (147 lb 14.9 oz)     Physical Exam  General: alert, on a chair in fast-track.  Frequently clearing her throat reports that she feels like she is choking on something.  ENT:  Moist mucus membranes.  Normal tongue.  Oropharynx appears slightly  erythematous.  No exudate or tonsillar hypertrophy.  N uvula appears edematous, the tip of the uvula is necrotic.  No foreign bodies.  Left greater than right cervical anterior lymphadenopathy, slightly tender.   Normal voice.  No stridor.  No drooling.  Resp: normal effort  MSK: no bony tenderness  Skin: appropriately warm and dry  Neuro: alert, clear speech, oriented  Psych: normal mood and affect      Diagnostics     Lab Results   Labs Ordered and Resulted from Time of ED Arrival to Time of ED Departure   BASIC METABOLIC PANEL - Abnormal       Result Value    Sodium 141      Potassium 4.4      Chloride 105      Carbon Dioxide (CO2) 22      Anion Gap 14      Urea Nitrogen 11.8      Creatinine 0.47 (*)     GFR Estimate >90      Calcium 9.6      Glucose 125 (*)    ISTAT CREATININE POCT - Normal    Creatinine POCT 0.5      GFR, ESTIMATED POCT >60     GROUP A STREPTOCOCCUS PCR THROAT SWAB - Normal    Group A strep by PCR Not Detected     CBC WITH PLATELETS AND DIFFERENTIAL    WBC Count 4.1      RBC Count 4.75      Hemoglobin 14.5      Hematocrit 43.9      MCV 92      MCH 30.5      MCHC 33.0      RDW 12.2      Platelet Count 183      % Neutrophils 51      % Lymphocytes 38      % Monocytes 8      % Eosinophils 2      % Basophils 1      % Immature Granulocytes 0      NRBCs per 100 WBC 0      Absolute Neutrophils 2.1      Absolute Lymphocytes 1.6      Absolute Monocytes 0.3      Absolute Eosinophils 0.1      Absolute Basophils 0.0      Absolute Immature Granulocytes 0.0      Absolute NRBCs 0.0         Imaging   Soft tissue neck CT w contrast   Final Result   IMPRESSION:    1.  No neck abscess, lymphadenopathy, or mass.   2.  No radiopaque foreign body within the neck.          EKG   none    Independent Interpretation   None    ED Course      Medications Administered   Medications - No data to display    Procedures   Procedures     Discussion of Management   ENT, Dr. Dobson    ED Course   ED Course as of 04/14/25 1205   Sun  Apr 13, 2025   1213 I obtained history and examined the patient as noted above.     1535 I spoke to Dr. Dobson.  Infectious, traumatic, chemical from reflux.  Consider a course of steroids.  No antibiotics.  Follow up in clinic.   1556 The patient.  She is feeling much better, able to tolerate oral fluids.  I reexamined her.  She continues to have mild uvular edema, with discoloration at the tip of the uvula.  An additional history, she states that she had 5 episodes of vomiting as well as diarrhea 4 days ago, though this is resolved.  Symptoms did not start till today.       Additional Documentation  None    Medical Decision Making / Diagnosis     CMS Diagnoses: None    MIPS       None    MDM   Charlette Rodgers is a 69 year old female history of type 2 diabetes, presenting today for evaluation of sore throat and foreign body sensation in the back of her throat.  On exam, the patient is afebrile, and has findings consistent with uvulitis.  However, she also has some apparent trauma to the uvula with is some dark appearance at the tip of the uvula.  Given foreign body sensation, CT soft tissue of the neck was obtained.  Fortunately, this is negative for any direct or indirect evidence of foreign body, mass, or acute infection.  On recheck, she is feeling improved.  I discussed the case briefly with Dr. Dobson, who suggest that this could be either infectious, traumatic, or chemical irritation.  On repeat history, the patient states that she was vomiting quite a bit about 5 days ago.  I suspect this is because trauma to her uvula and some inflammation.  I recommended continued Decadron for the next several days, and follow-up with ENT especially if symptoms persist.  Return to the ED right away for worsening symptoms including pain, difficulty swallowing, difficulty breathing, or for any other concerns.    Disposition   The patient was discharged.     Diagnosis     ICD-10-CM    1. Uvulitis  K12.2 Adult ENT   Referral           Discharge Medications   Discharge Medication List as of 4/13/2025  4:03 PM        START taking these medications    Details   dexAMETHasone (DECADRON) 1 MG/ML (HIGH CONC) solution Take 6 mLs (6 mg) by mouth daily for 2 days., Disp-12 mL, R-0, E-Prescribe           Scribe Disclosure:  I, Bhavik Wick, am serving as a scribe at 12:18 PM on 4/13/2025 to document services personally performed by Vanessa Patrick MD based on my observations and the provider's statements to me.        Vanessa Patrick MD  04/14/25 0523

## 2025-04-13 NOTE — ED TRIAGE NOTES
Patient states she woke up with a pain in her throat, patient states she felt congested and coughed and the congestion moved to her throat, Patient now feels like she needs to clear her throat.  Patient's daughter states patient's Uvula is black at the end     Triage Assessment (Adult)       Row Name 04/13/25 1059          Triage Assessment    Airway WDL WDL        Respiratory WDL    Respiratory WDL WDL        Skin Circulation/Temperature WDL    Skin Circulation/Temperature WDL WDL        Cardiac WDL    Cardiac WDL WDL        Peripheral/Neurovascular WDL    Peripheral Neurovascular WDL WDL        Cognitive/Neuro/Behavioral WDL    Cognitive/Neuro/Behavioral WDL WDL